# Patient Record
Sex: FEMALE | Race: WHITE | NOT HISPANIC OR LATINO | URBAN - METROPOLITAN AREA
[De-identification: names, ages, dates, MRNs, and addresses within clinical notes are randomized per-mention and may not be internally consistent; named-entity substitution may affect disease eponyms.]

---

## 2023-04-26 DIAGNOSIS — G47.00 INSOMNIA, UNSPECIFIED TYPE: Primary | ICD-10-CM

## 2023-04-26 RX ORDER — NORTRIPTYLINE HYDROCHLORIDE 10 MG/1
10 CAPSULE ORAL NIGHTLY
COMMUNITY
End: 2023-04-26 | Stop reason: SDUPTHER

## 2023-04-26 RX ORDER — NORTRIPTYLINE HYDROCHLORIDE 10 MG/1
10 CAPSULE ORAL NIGHTLY
Qty: 90 CAPSULE | Refills: 1 | Status: SHIPPED | OUTPATIENT
Start: 2023-04-26 | End: 2023-10-12 | Stop reason: SDUPTHER

## 2023-09-12 LAB
ALANINE AMINOTRANSFERASE (SGPT) (U/L) IN SER/PLAS: 33 U/L (ref 7–45)
ALBUMIN (G/DL) IN SER/PLAS: 4.3 G/DL (ref 3.4–5)
ALKALINE PHOSPHATASE (U/L) IN SER/PLAS: 53 U/L (ref 33–110)
ANION GAP IN SER/PLAS: 12 MMOL/L (ref 10–20)
ASPARTATE AMINOTRANSFERASE (SGOT) (U/L) IN SER/PLAS: 19 U/L (ref 9–39)
BILIRUBIN TOTAL (MG/DL) IN SER/PLAS: 0.8 MG/DL (ref 0–1.2)
CALCIDIOL (25 OH VITAMIN D3) (NG/ML) IN SER/PLAS: 28 NG/ML
CALCIUM (MG/DL) IN SER/PLAS: 9.4 MG/DL (ref 8.6–10.6)
CARBON DIOXIDE, TOTAL (MMOL/L) IN SER/PLAS: 29 MMOL/L (ref 21–32)
CHLORIDE (MMOL/L) IN SER/PLAS: 103 MMOL/L (ref 98–107)
CREATININE (MG/DL) IN SER/PLAS: 0.64 MG/DL (ref 0.5–1.05)
DEHYDROEPIANDROSTERONE SULFATE (DHEA-S) (UG/DL) IN SER/: 218 UG/DL (ref 65–395)
ESTIMATED AVERAGE GLUCOSE FOR HBA1C: 91 MG/DL
FOLLITROPIN (IU/L) IN SER/PLAS: 3.9 IU/L
GFR FEMALE: >90 ML/MIN/1.73M2
GLUCOSE (MG/DL) IN SER/PLAS: 82 MG/DL (ref 74–99)
HEMOGLOBIN A1C/HEMOGLOBIN TOTAL IN BLOOD: 4.8 %
INSULIN: 25 UIU/ML (ref 3–25)
LIPASE (U/L) IN SER/PLAS: 45 U/L (ref 9–82)
LUTEINIZING HORMONE (IU/ML) IN SER/PLAS: 7.9 IU/L
POTASSIUM (MMOL/L) IN SER/PLAS: 4.6 MMOL/L (ref 3.5–5.3)
PROTEIN TOTAL: 6.8 G/DL (ref 6.4–8.2)
SODIUM (MMOL/L) IN SER/PLAS: 139 MMOL/L (ref 136–145)
THYROTROPIN (MIU/L) IN SER/PLAS BY DETECTION LIMIT <= 0.05 MIU/L: 0.9 MIU/L (ref 0.44–3.98)
UREA NITROGEN (MG/DL) IN SER/PLAS: 11 MG/DL (ref 6–23)

## 2023-09-18 LAB
TESTOSTERONE FREE (CHAN): 10.5 PG/ML (ref 0.1–6.4)
TESTOSTERONE,TOTAL,LC-MS/MS: 68 NG/DL (ref 2–45)

## 2023-10-04 ENCOUNTER — TELEPHONE (OUTPATIENT)
Dept: ENDOCRINOLOGY | Facility: CLINIC | Age: 33
End: 2023-10-04
Payer: COMMERCIAL

## 2023-10-04 ENCOUNTER — TELEPHONE (OUTPATIENT)
Dept: ENDOCRINOLOGY | Facility: CLINIC | Age: 33
End: 2023-10-04

## 2023-10-04 ENCOUNTER — DOCUMENTATION (OUTPATIENT)
Dept: ENDOCRINOLOGY | Facility: CLINIC | Age: 33
End: 2023-10-04
Payer: COMMERCIAL

## 2023-10-04 DIAGNOSIS — L68.0 HIRSUTISM: ICD-10-CM

## 2023-10-04 DIAGNOSIS — L68.0 HIRSUTISM: Primary | ICD-10-CM

## 2023-10-04 RX ORDER — SPIRONOLACTONE 50 MG/1
50 TABLET, FILM COATED ORAL 2 TIMES DAILY
Qty: 60 TABLET | Refills: 2 | Status: SHIPPED | OUTPATIENT
Start: 2023-10-04 | End: 2023-10-11

## 2023-10-04 NOTE — TELEPHONE ENCOUNTER
Talked to the patient and refilled 50mg Spironolactone bid. Patient is a special needs and has her Mom on her side verifying the medication.  Patient is on TriSprintec for PCOS and contraception, has not been sexually active.    Christiana Mansfield MD, PGY6 3:05 PM 10/04/23

## 2023-10-11 DIAGNOSIS — K21.9 GASTROESOPHAGEAL REFLUX DISEASE, UNSPECIFIED WHETHER ESOPHAGITIS PRESENT: ICD-10-CM

## 2023-10-11 RX ORDER — SPIRONOLACTONE 50 MG/1
50 TABLET, FILM COATED ORAL 2 TIMES DAILY
Qty: 180 TABLET | Refills: 2 | Status: SHIPPED | OUTPATIENT
Start: 2023-10-11 | End: 2024-02-01 | Stop reason: SDUPTHER

## 2023-10-11 RX ORDER — OMEPRAZOLE 40 MG/1
40 CAPSULE, DELAYED RELEASE ORAL DAILY
COMMUNITY
Start: 2023-05-25 | End: 2023-10-11 | Stop reason: SDUPTHER

## 2023-10-11 RX ORDER — OMEPRAZOLE 40 MG/1
40 CAPSULE, DELAYED RELEASE ORAL DAILY
Qty: 90 CAPSULE | Refills: 0 | Status: SHIPPED | OUTPATIENT
Start: 2023-10-11 | End: 2024-01-02 | Stop reason: SDUPTHER

## 2023-10-12 DIAGNOSIS — G47.00 INSOMNIA, UNSPECIFIED TYPE: ICD-10-CM

## 2023-10-13 RX ORDER — NORTRIPTYLINE HYDROCHLORIDE 10 MG/1
10 CAPSULE ORAL NIGHTLY
Qty: 90 CAPSULE | Refills: 1 | Status: SHIPPED | OUTPATIENT
Start: 2023-10-13 | End: 2024-03-28 | Stop reason: SDUPTHER

## 2023-12-05 DIAGNOSIS — N92.6 IRREGULAR MENSTRUATION: Primary | ICD-10-CM

## 2023-12-05 RX ORDER — NORGESTIMATE AND ETHINYL ESTRADIOL 7DAYSX3 28
1 KIT ORAL DAILY
Qty: 28 TABLET | Refills: 5 | Status: SHIPPED | OUTPATIENT
Start: 2023-12-05 | End: 2024-06-03 | Stop reason: SDUPTHER

## 2023-12-05 RX ORDER — NORGESTIMATE AND ETHINYL ESTRADIOL 7DAYSX3 28
1 KIT ORAL DAILY
COMMUNITY
End: 2023-12-05 | Stop reason: SDUPTHER

## 2023-12-14 DIAGNOSIS — J30.9 ALLERGIC RHINITIS, UNSPECIFIED SEASONALITY, UNSPECIFIED TRIGGER: Primary | ICD-10-CM

## 2023-12-14 RX ORDER — CETIRIZINE HYDROCHLORIDE 10 MG/1
10 TABLET ORAL NIGHTLY
COMMUNITY
End: 2023-12-14 | Stop reason: SDUPTHER

## 2023-12-15 RX ORDER — CETIRIZINE HYDROCHLORIDE 10 MG/1
10 TABLET ORAL NIGHTLY
Qty: 90 TABLET | Refills: 1 | Status: SHIPPED | OUTPATIENT
Start: 2023-12-15 | End: 2024-02-05 | Stop reason: WASHOUT

## 2023-12-28 ENCOUNTER — ANCILLARY PROCEDURE (OUTPATIENT)
Dept: RADIOLOGY | Facility: CLINIC | Age: 33
End: 2023-12-28
Payer: COMMERCIAL

## 2023-12-28 ENCOUNTER — OFFICE VISIT (OUTPATIENT)
Dept: PRIMARY CARE | Facility: CLINIC | Age: 33
End: 2023-12-28
Payer: COMMERCIAL

## 2023-12-28 VITALS
WEIGHT: 232 LBS | SYSTOLIC BLOOD PRESSURE: 148 MMHG | HEIGHT: 71 IN | OXYGEN SATURATION: 97 % | BODY MASS INDEX: 32.48 KG/M2 | DIASTOLIC BLOOD PRESSURE: 88 MMHG | HEART RATE: 107 BPM

## 2023-12-28 DIAGNOSIS — Z00.00 HEALTH MAINTENANCE EXAMINATION: Primary | ICD-10-CM

## 2023-12-28 DIAGNOSIS — M25.572 ACUTE LEFT ANKLE PAIN: ICD-10-CM

## 2023-12-28 PROBLEM — Q87.3: Status: ACTIVE | Noted: 2023-12-28

## 2023-12-28 PROBLEM — E22.1 HYPERPROLACTINEMIA (MULTI): Status: ACTIVE | Noted: 2023-12-28

## 2023-12-28 PROBLEM — L68.0 FEMALE HIRSUTISM: Status: ACTIVE | Noted: 2022-01-12

## 2023-12-28 PROBLEM — K21.9 GERD (GASTROESOPHAGEAL REFLUX DISEASE): Status: ACTIVE | Noted: 2023-12-28

## 2023-12-28 PROBLEM — F41.9 ANXIETY: Status: ACTIVE | Noted: 2023-12-28

## 2023-12-28 PROBLEM — N91.1 AMENORRHEA, SECONDARY: Status: ACTIVE | Noted: 2023-12-28

## 2023-12-28 PROBLEM — Q89.7 DYSMORPHIC FEATURES: Status: ACTIVE | Noted: 2023-12-28

## 2023-12-28 PROBLEM — N92.6 IRREGULAR MENSTRUAL CYCLE: Status: ACTIVE | Noted: 2023-12-28

## 2023-12-28 PROBLEM — F89 DEVELOPMENTAL DISABILITY: Status: ACTIVE | Noted: 2023-12-28

## 2023-12-28 PROBLEM — E24.9 HYPERCORTISOLISM (MULTI): Status: ACTIVE | Noted: 2023-12-28

## 2023-12-28 PROBLEM — K59.00 CONSTIPATION: Status: ACTIVE | Noted: 2023-12-28

## 2023-12-28 PROBLEM — E88.819 INSULIN RESISTANCE: Status: ACTIVE | Noted: 2023-12-28

## 2023-12-28 PROBLEM — H50.34 INTERMITTENT EXOTROPIA, ALTERNATING: Status: ACTIVE | Noted: 2021-09-09

## 2023-12-28 PROBLEM — Q04.5: Status: ACTIVE | Noted: 2023-12-28

## 2023-12-28 PROBLEM — E28.8 HYPERANDROGENISM: Status: ACTIVE | Noted: 2023-12-28

## 2023-12-28 PROBLEM — E34.9 FEMALE HYPERTESTOSTERONEMIA: Status: ACTIVE | Noted: 2023-12-28

## 2023-12-28 PROBLEM — H55.01 CONGENITAL NYSTAGMUS: Status: ACTIVE | Noted: 2021-09-09

## 2023-12-28 PROBLEM — N97.9 FEMALE INFERTILITY: Status: ACTIVE | Noted: 2023-12-28

## 2023-12-28 PROBLEM — L65.9 FRONTAL BALDING: Status: ACTIVE | Noted: 2023-12-28

## 2023-12-28 PROBLEM — G43.909 MIGRAINE HEADACHE: Status: ACTIVE | Noted: 2023-12-28

## 2023-12-28 PROCEDURE — 73610 X-RAY EXAM OF ANKLE: CPT | Mod: LEFT SIDE | Performed by: RADIOLOGY

## 2023-12-28 PROCEDURE — 73610 X-RAY EXAM OF ANKLE: CPT | Mod: LT

## 2023-12-28 PROCEDURE — 99395 PREV VISIT EST AGE 18-39: CPT | Performed by: FAMILY MEDICINE

## 2023-12-28 ASSESSMENT — PATIENT HEALTH QUESTIONNAIRE - PHQ9
SUM OF ALL RESPONSES TO PHQ9 QUESTIONS 1 AND 2: 1
1. LITTLE INTEREST OR PLEASURE IN DOING THINGS: NOT AT ALL
2. FEELING DOWN, DEPRESSED OR HOPELESS: SEVERAL DAYS

## 2023-12-28 NOTE — ASSESSMENT & PLAN NOTE
Tenderness but no abnormality noted  Due to pt's disability will go ahead and obtain xray  Suspect tendinitis versus anxiety  Do recommend compression, elevation and icing to see if it improves

## 2023-12-28 NOTE — PROGRESS NOTES
Problem: Altered Mood, Manic Behavior:  Goal: Able to verbalize decrease in frequency and intensity of racing thoughts  Able to verbalize decrease in frequency and intensity of racing thoughts   Outcome: Ongoing  Psychoeducation Group Note    Date: 7/30/18  Start Time: 1330  End Time: 1425    Number Participants in Group:  10    Goal:  Patient will demonstrate increased interpersonal interaction.    Topic:  Relapse Prevention Plan Group    Discipline Responsible:   OT  AT  [facility]. X RT  Other       Participation Level:     None  Minimal   X Active Listener X Interactive    Monopolizing         Participation Quality:  X Appropriate  Inappropriate   X       Attentive        Intrusive   X       Sharing        Resistant   X       Supportive        Lethargic       Affective:   X Congruent  Incongruent  Blunted  Flat    Constricted  Anxious  Elated  Angry    Labile  Depressed  Other  Bright       Cognitive:  X Alert X Oriented PPTP     Concentration X G  F  P   Attention Span X G  F  P   Short-Term Memory X G  F  P   Long-Term Memory X G  F  P   ProblemSolving/  Decision Making X G  F  P   Ability to Process  Information X G  F  P      Contributing Factors             Delusional             Hallucinating             Flight of Ideas             Other:       Modes of Intervention:  X Education X Support X Exploration   X Clarifying X Problem Solving  Confrontation    Socialization X Limit Setting  Reality Testing    Activity  Movement  Media    Other:            Response to Learning:  X Able to verbalize current knowledge/experience   X Able to verbalize/acknowledge new learning   X Able to retain information   X Capable of insight    Able to change behavior   X Progressing to goal    Other:        Comments: Reason for Visit: Annual Physical Exam    HPI:  Presents for wellness exam  Doing ok overall  Continues to have knee pain bilaterally, chronic in nature, does not seem to change  Noticed some left ankle pain recently without any known injury  Was seeing endocrine but she left , does have information for a different physician but has not followed up    Active Problem List  Patient Active Problem List   Diagnosis    Amenorrhea, secondary    Anxiety    Congenital nystagmus    Constipation    Developmental disability    Dysmorphic features    Female hirsutism    Female hypertestosteronemia    Female infertility    Frontal balding    GERD (gastroesophageal reflux disease)    Hyperandrogenism    Hypercortisolism (CMS/HCC)    Hyperprolactinemia (CMS/HCC)    Insulin resistance    Intermittent exotropia, alternating    Irregular menstrual cycle    Velia overgrowth syndrome    Megalencephaly (CMS/HCC)    Migraine headache    Health maintenance examination    Acute left ankle pain       Comprehensive Medical/Surgical/Social/Family History  Past Medical History:   Diagnosis Date    Body mass index (BMI) 34.0-34.9, adult 12/27/2020    Body mass index (BMI) of 34.0 to 34.9 in adult    Irregular menstruation, unspecified 01/13/2022    Irregular menstrual cycle    Megalencephaly (CMS/HCC) 12/06/2021    Megalencephaly    Migraine, unspecified, not intractable, without status migrainosus 01/04/2022    Migraine headache    Multiple congenital malformations, not elsewhere classified 01/13/2022    Dysmorphic features    Nonscarring hair loss, unspecified 01/13/2022    Frontal balding    Other conditions influencing health status     Nulliparity     Past Surgical History:   Procedure Laterality Date    OTHER SURGICAL HISTORY  03/18/2022    Tonsillectomy    OTHER SURGICAL HISTORY  03/18/2022    Eye surgery     Social History     Tobacco Use    Smoking status: Never     No family history on file.      Allergies and Medications  Patient  "has no known allergies.  Current Outpatient Medications on File Prior to Visit   Medication Sig Dispense Refill    norgestimate-ethinyl estradioL (Tri-Sprintec, 28,) 0.18/0.215/0.25 mg-35 mcg (28) tablet Take 1 tablet by mouth once daily. as directed 28 tablet 5    nortriptyline (Pamelor) 10 mg capsule Take 1 capsule (10 mg) by mouth once daily at bedtime. 90 capsule 1    omeprazole (PriLOSEC) 40 mg DR capsule Take 1 capsule (40 mg) by mouth once daily. 90 capsule 0    spironolactone (Aldactone) 50 mg tablet TAKE 1 TABLET(50 MG) BY MOUTH TWICE DAILY 180 tablet 2    cetirizine (ZyrTEC) 10 mg tablet Take 1 tablet (10 mg) by mouth once daily at bedtime. (Patient not taking: Reported on 12/28/2023) 90 tablet 1     No current facility-administered medications on file prior to visit.         ROS otherwise negative aside from what was mentioned above in HPI.    Vitals  /88 (BP Location: Right arm, Patient Position: Sitting, BP Cuff Size: Adult)   Pulse 107   Ht 1.803 m (5' 11\")   Wt 105 kg (232 lb)   LMP 12/24/2023 (Approximate)   SpO2 97%   BMI 32.36 kg/m²   Body mass index is 32.36 kg/m².  Physical Exam  Gen: Alert, NAD  HEENT:  PERRL, EOMI, conjunctiva and sclera normal in appearance.  Neck:  Supple with FROM; No masses/nodes palpable  Respiratory:  Lungs CTAB  Cardiovascular:  Heart RRR. No M/R/G. Peripheral pulses equal bilaterally  Abdomen:  Soft, nontender  Extremities:  FROM all extremities; Muscle strength grossly normal, left ankle without any swelling, bruising or visible abnormality, mild tenderness over left malleolus,   Neuro:  CN II-XII intact; Gross motor and sensory intact  Skin:  No suspicious lesions present, hirsutism noted    Assessment and Plan:  Problem List Items Addressed This Visit       Health maintenance examination - Primary    Current Assessment & Plan     Recommended screening guidelines addressed and orders placed as indicated by age and chronic conditions  Screening labs " reviewed from recent endocrine appointment, do encourage reestablishing with endocrine  Continue to work on healthy lifestyle including well balanced diet, regular activity, limit alcohol, no tobacco products and safe sexual practices  Follow up annually           Acute left ankle pain    Current Assessment & Plan     Tenderness but no abnormality noted  Due to pt's disability will go ahead and obtain xray  Suspect tendinitis versus anxiety  Do recommend compression, elevation and icing to see if it improves         Relevant Orders    XR ankle left 3+ views         Heather Husain MD

## 2023-12-28 NOTE — ASSESSMENT & PLAN NOTE
Recommended screening guidelines addressed and orders placed as indicated by age and chronic conditions  Screening labs reviewed from recent endocrine appointment, do encourage reestablishing with endocrine  Continue to work on healthy lifestyle including well balanced diet, regular activity, limit alcohol, no tobacco products and safe sexual practices  Follow up annually

## 2024-01-02 DIAGNOSIS — K21.9 GASTROESOPHAGEAL REFLUX DISEASE, UNSPECIFIED WHETHER ESOPHAGITIS PRESENT: ICD-10-CM

## 2024-01-02 RX ORDER — OMEPRAZOLE 40 MG/1
40 CAPSULE, DELAYED RELEASE ORAL DAILY
Qty: 30 CAPSULE | Refills: 0 | Status: SHIPPED | OUTPATIENT
Start: 2024-01-02

## 2024-01-23 DIAGNOSIS — M25.572 ACUTE LEFT ANKLE PAIN: Primary | ICD-10-CM

## 2024-02-01 ENCOUNTER — OFFICE VISIT (OUTPATIENT)
Dept: ENDOCRINOLOGY | Facility: CLINIC | Age: 34
End: 2024-02-01
Payer: COMMERCIAL

## 2024-02-01 ENCOUNTER — LAB (OUTPATIENT)
Dept: LAB | Facility: LAB | Age: 34
End: 2024-02-01
Payer: COMMERCIAL

## 2024-02-01 VITALS
WEIGHT: 234.8 LBS | HEART RATE: 111 BPM | TEMPERATURE: 97.8 F | SYSTOLIC BLOOD PRESSURE: 162 MMHG | HEIGHT: 72 IN | BODY MASS INDEX: 31.8 KG/M2 | DIASTOLIC BLOOD PRESSURE: 92 MMHG

## 2024-02-01 DIAGNOSIS — E28.2 PCOS (POLYCYSTIC OVARIAN SYNDROME): ICD-10-CM

## 2024-02-01 DIAGNOSIS — R73.9 HYPERGLYCEMIA: ICD-10-CM

## 2024-02-01 DIAGNOSIS — E88.819 INSULIN RESISTANCE: Primary | ICD-10-CM

## 2024-02-01 DIAGNOSIS — E88.819 INSULIN RESISTANCE: ICD-10-CM

## 2024-02-01 DIAGNOSIS — L68.0 HIRSUTISM: ICD-10-CM

## 2024-02-01 LAB
ALBUMIN SERPL BCP-MCNC: 4.6 G/DL (ref 3.4–5)
ANION GAP SERPL CALC-SCNC: 15 MMOL/L (ref 10–20)
BUN SERPL-MCNC: 9 MG/DL (ref 6–23)
CALCIUM SERPL-MCNC: 10 MG/DL (ref 8.6–10.6)
CHLORIDE SERPL-SCNC: 102 MMOL/L (ref 98–107)
CO2 SERPL-SCNC: 26 MMOL/L (ref 21–32)
CREAT SERPL-MCNC: 0.69 MG/DL (ref 0.5–1.05)
DHEA-S SERPL-MCNC: 265 UG/DL (ref 12–379)
EGFRCR SERPLBLD CKD-EPI 2021: >90 ML/MIN/1.73M*2
EST. AVERAGE GLUCOSE BLD GHB EST-MCNC: 85 MG/DL
GLUCOSE SERPL-MCNC: 80 MG/DL (ref 74–99)
HBA1C MFR BLD: 4.6 %
PHOSPHATE SERPL-MCNC: 4.4 MG/DL (ref 2.5–4.9)
POTASSIUM SERPL-SCNC: 4.5 MMOL/L (ref 3.5–5.3)
SODIUM SERPL-SCNC: 138 MMOL/L (ref 136–145)

## 2024-02-01 PROCEDURE — 82627 DEHYDROEPIANDROSTERONE: CPT

## 2024-02-01 PROCEDURE — 83036 HEMOGLOBIN GLYCOSYLATED A1C: CPT

## 2024-02-01 PROCEDURE — 80069 RENAL FUNCTION PANEL: CPT

## 2024-02-01 PROCEDURE — 84402 ASSAY OF FREE TESTOSTERONE: CPT

## 2024-02-01 PROCEDURE — 99214 OFFICE O/P EST MOD 30 MIN: CPT | Performed by: STUDENT IN AN ORGANIZED HEALTH CARE EDUCATION/TRAINING PROGRAM

## 2024-02-01 PROCEDURE — 1036F TOBACCO NON-USER: CPT | Performed by: STUDENT IN AN ORGANIZED HEALTH CARE EDUCATION/TRAINING PROGRAM

## 2024-02-01 PROCEDURE — 36415 COLL VENOUS BLD VENIPUNCTURE: CPT

## 2024-02-01 RX ORDER — SPIRONOLACTONE 50 MG/1
50 TABLET, FILM COATED ORAL 2 TIMES DAILY
Qty: 180 TABLET | Refills: 2 | Status: SHIPPED | OUTPATIENT
Start: 2024-02-01

## 2024-02-01 NOTE — PATIENT INSTRUCTIONS
-when you get off trulicity, let me know about 3 months after so I can order your glucose tolerance test      Follow up in 6 months     Angela Robles MD  Divison of Endocrinology   [facility]   Phone: [phone]    option 4, then option 1  Fax: [phone]

## 2024-02-01 NOTE — PROGRESS NOTES
33 F Velia sotos like syndrome    Following up regarding insulin resistance, previously seen Dr. 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    Social History     Socioeconomic History    Marital status: Single     Spouse name: Not on file    Number of children: Not on file    Years of education: Not on file    Highest education level: Not on file   Occupational History    Not on file   Tobacco Use    Smoking status: Never    Smokeless tobacco: Never   Substance and Sexual Activity    Alcohol use: Not on file    Drug use: Not on file    Sexual activity: Not on file   Other Topics Concern    Not on file   Social History Narrative    Not on file     Social Determinants of Health     Financial Resource Strain: Not on file   Food Insecurity: Not on file   Transportation Needs: Not on file   Physical Activity: Not on file   Stress: Not on file   Social Connections: Not on file   Intimate Partner Violence: Not on file   Housing Stability: Not on file     ROS reviewed and is negative except for  pertinent findings noted on HPI    Physical Exam  Constitutional:       Appearance: Normal appearance.      Comments: Enlarged forehead   Cardiovascular:      Rate and Rhythm: Normal rate and regular rhythm.   Abdominal:      Palpations: Abdomen is soft.      Comments: Abdominal obesity   Musculoskeletal:         General: No swelling or tenderness.   Skin:     General: Skin is warm.      Comments: Male pattern baldness, terminal hair on the arms and legs    Neurological:      General: No focal deficit present.      Mental Status: She is alert and oriented to person, place, and time.   Psychiatric:         Mood and Affect: Mood normal.         Behavior: Behavior normal.         labs and imaging reviewed, pertinent findings listed on HPI and Impression    Problem List Items Addressed This Visit       Insulin resistance - Primary    Relevant Medications    dulaglutide (Trulicity) 0.75 mg/0.5 mL pen injector    Other Relevant Orders    Hemoglobin A1C     Other Visit Diagnoses       Hirsutism        Relevant Medications    spironolactone (Aldactone) 50 mg tablet    PCOS (polycystic ovarian syndrome)        Relevant Medications    dulaglutide (Trulicity) 0.75 mg/0.5 mL pen injector    Other Relevant Orders    Hemoglobin A1C    DHEA-Sulfate    Testosterone,Free and Total    Renal Function Panel    Hyperglycemia        Relevant Orders    Hemoglobin A1C          Insulin Resistance   -continue spironolactone 50mg BID  -metabolic/diabetic screening labs   -continue trulicity 0.75mg once week    Might need OGTT for DM screening

## 2024-02-05 ENCOUNTER — HOSPITAL ENCOUNTER (OUTPATIENT)
Dept: RADIOLOGY | Facility: HOSPITAL | Age: 34
Discharge: HOME | End: 2024-02-05
Payer: COMMERCIAL

## 2024-02-05 ENCOUNTER — OFFICE VISIT (OUTPATIENT)
Dept: ORTHOPEDIC SURGERY | Facility: HOSPITAL | Age: 34
End: 2024-02-05
Payer: COMMERCIAL

## 2024-02-05 DIAGNOSIS — M25.572 LEFT ANKLE PAIN, UNSPECIFIED CHRONICITY: ICD-10-CM

## 2024-02-05 DIAGNOSIS — M76.72 PERONEAL TENDONITIS, LEFT: Primary | ICD-10-CM

## 2024-02-05 DIAGNOSIS — M76.829 POSTERIOR TIBIAL TENDON DYSFUNCTION: ICD-10-CM

## 2024-02-05 PROCEDURE — L4361 PNEUMA/VAC WALK BOOT PRE OTS: HCPCS | Performed by: ORTHOPAEDIC SURGERY

## 2024-02-05 PROCEDURE — 1036F TOBACCO NON-USER: CPT | Performed by: ORTHOPAEDIC SURGERY

## 2024-02-05 PROCEDURE — 99204 OFFICE O/P NEW MOD 45 MIN: CPT | Performed by: ORTHOPAEDIC SURGERY

## 2024-02-05 PROCEDURE — 73610 X-RAY EXAM OF ANKLE: CPT | Mod: LEFT SIDE | Performed by: RADIOLOGY

## 2024-02-05 PROCEDURE — 99214 OFFICE O/P EST MOD 30 MIN: CPT | Performed by: ORTHOPAEDIC SURGERY

## 2024-02-05 PROCEDURE — 73610 X-RAY EXAM OF ANKLE: CPT | Mod: LT

## 2024-02-05 NOTE — PROGRESS NOTES
NPV-   History of Present Illness  33 y.o.female  1. Peroneal tendonitis, left  Walking Boot Tall    Referral to Physical Therapy      2. Left ankle pain, unspecified chronicity  XR ankle left 3+ views      3. Posterior tibial tendon dysfunction          Mechanism of injury:   Date of Injury/Pain:   Location of pain:   Quality of pain:   Frequency of Pain:   Associated symptoms? Swelling.  Previous treatment?

## 2024-02-05 NOTE — PROGRESS NOTES
HISTORY OF PRESENT ILLNESS  This is a pleasant 33 y.o. year old female  who presents on 02/05/2024 at the request of Stephanie Krause, EZEKIEL [unfilled] for evaluation of the left ankle and foot and    pain that has been present over/since 4 weeks.    How the condition occurred or started: increased walking/standing at work  Location of pain (patient points to): lateral ankle  Quality of pain: Moderate  Modifying Factors: walking standing  Associated Signs and symptoms: swelling  Previous Treatment: ankle wrap  PMH of Velia overgrowth syndrome    PHYSICAL EXAMINATION  Constitutional Exam: patient's height and weight reviewed, well-kempt  Psychiatric Exam: alert and oriented x 3, appropriate mood and behavior  Eye Exam: PIEDAD, EOMI  Pulmonary Exam: breathing non-labored, no apparent distress  Lymphatic exam: no appreciable lymphadenopathy in the lower extremities  Cardiovascular exam: DP pulses 2+ bilaterally, PT 2+ bilaterally, toes are pink with good capillary refill, no pitting edema  Skin exam: no open lesions, rashes, abrasions or ulcerations  Neurological exam: sensation to light touch intact in both lower extremities in peripheral and dermatomal distributions (except for any abnormalities noted in musculoskeletal exam)    Musculoskeletal exam: On examination of the left  ankle/foot:  Normal gait  Pes planus alignment  Minimal swelling; No ecchymosis or erythema. Skin intact; no ulcers or lesions.   Normal ROM in  ankle plantarflexion, dorsiflexion, inversion and eversion; normal ROM in 2-5th toe flexion/extension and 1st MTP flexion/extension  Normal strength in ankle plantarflexion, dorsiflexion, inversion and eversion; normal strength with great toe flexion/extension  Tenderness to palpation: peroneal tendons  No tenderness to palpation over the Achilles, medial and lateral malleolus, posterior tibial tendon, peroneal tendon, ATFL, deltoid ligament, talus or navicular.  no tenderness to palpation over  "heel, plantar arch, base of 1st metatarsal/2nd metatarsal, sesamoids, 5th metatarsal, medial cuneiform, navicular, 1st MTP joint or 2nd or 3rd intermetarsal space.  Neurovascularly intact. Good capillary refill. No sensory deficit to light touch. Normal proprioception. Dorsalis pedis and posterior tibial pulses 2+ bilaterally.    - Brown's test                              - Dorsiflexion-eversion test  - Anterior Drawer test                      - resisted dorsiflexion-eversion test  - Talar tilt test                                    - shuck testing  - Squeeze test  + \"too many toes\" sign      DATA/RESULTS REVIEW: I personally reviewed the patient's x-ray images and reports of the the left ankle and foot and they show no fractures or dislocations . Normal joint spacing     ASSESSMENT: left ankle peroneal tendonitis, b/l pttd grade 3  PLAN: I discussed with the patient the differential diagnosis, complex  nature of the condition(s) and available treatment option(s).  Patient was placed in a tall CAM walker boot to be WBAT with crutches.  They were given boot instructions. Patient is ambulatory and was given a prescription for orthotics, which are medically necessary due to the patient's medical condition and symptomatic pes planus. The patient was given a prescription for physical therapy.  Physical therapy is medically necessary to improve strength, balance, range of motion and functional outcomes after injury and/or surgery. Patient was given a handout and instructed on an at home stretching program.  They should do these exercises 3 times per day for 6 weeks and then daily. Patient can use OTC Voltaren gel, biofreeze or  aspercream for pain and continue to ice and elevate supported at the calf to reduce swelling .  The patient's questions were answered in detail.      Patient was prescribed a CAM walker boot for peroneal tendonitis.The patient is ambulatory with or without aid; but, has weakness, instability " "and/or deformity of their left ankle/foot which requires stabilization from this orthosis to improve their function.      Verbal and written instructions for the use, wear schedule, cleaning and application of this item were given.  Patient was instructed that should the brace result in increased pain, decreased sensation, increased swelling, or an overall worsening of their medical condition, to please contact our office immediately.     Orthotic management and training was provided for skin care, modifications due to healing tissues, edema changes, interruption in skin integrity, and safety precautions with the orthosis.      Note dictated with [employer] CK1 software, completed without full type editing to avoid delay.      Dear Referring Physician,  It was my pleasure to see your patient, in the office today.  Please refer to the detailed notes above for my findings and recommendations.  Thank you once again for your consultation request.  If you have any further questions or concerns, please feel free to contact me via email or at the phone number and address below.  Sincerely,    Vivian Huerta MD   of Orthopedic Surgery, [employer]  Dual Fellowship-trained in Orthopedic Sports Medicine (Knee and Shoulder), and Foot and Ankle Surgery  [facility] [place]   ABOS Subspecialty Certificate in Orthopedic Sports Medicine  Head Team Physician Case \"[place]\" and [facility] \"Storm\"  Team [place] OP Physician [id number] Paralympic Games  Team [place] US Figure Skating Medical Practitioner, including International Event Coverage  Director, Foot and Ankle Division, Department of Orthopedics    [address], [address] [address]  [email or web address]  Office [phone]    "

## 2024-02-05 NOTE — PROGRESS NOTES
HISTORY OF PRESENT ILLNESS  This is a pleasant 33 y.o. year old female  who presents on 02/05/2024 at the request of Stephanie Krause, [facility]KIEL [facility] for evaluation of the left ankle and foot and burning  pain that has been present over/since 6+ weeks.    How the condition occurred or started: Increased work on her feet   Location of pain (patient points to): left lateral malleolus   Quality of pain: Moderate  Modifying Factors: Compression sleeve with no relief   Associated Signs and symptoms: Pain to flexion ***   Previous Treatment: ***   PMH of Velia overgrowth syndrome    PHYSICAL EXAMINATION  Constitutional Exam: patient's height and weight reviewed, well-kempt  Psychiatric Exam: alert and oriented x 3, appropriate mood and behavior  Eye Exam: PIEDAD, EOMI  Pulmonary Exam: breathing non-labored, no apparent distress  Lymphatic exam: no appreciable lymphadenopathy in the lower extremities  Cardiovascular exam: DP pulses 2+ bilaterally, PT 2+ bilaterally, toes are pink with good capillary refill, no pitting edema  Skin exam: no open lesions, rashes, abrasions or ulcerations  Neurological exam: sensation to light touch intact in both lower extremities in peripheral and dermatomal distributions (except for any abnormalities noted in musculoskeletal exam)    Musculoskeletal exam: *** Patient's     DATA/RESULTS REVIEW: I personally reviewed the patient's x-ray images and reports of the the left ankle and foot.     ASSESSMENT: ***  PLAN: I discussed with the patient the differential diagnosis, complex *** nature of the condition(s) and available treatment option(s).  ***.  The patient's questions were answered in detail.      Note dictated with [employer] software, completed without full type editing to avoid delay.

## 2024-02-05 NOTE — PATIENT INSTRUCTIONS
YOUR BOOT INSTRUCTIONS:  You can put weight on your foot and ankle while in the boot.    You can use crutches or walker for support as needed.   You should wear boot when walking or standing for 3 weeks.  You can take off your boot while bathing, sitting, stretching, icing or doing therapy exercises.  You can take your boot off at nighttime while sleeping.    BOOT WEANING:  DAY 1-- come out of boot for 1 hour (wear supportive athletic shoes and orthotic inserts), rest of the day in boot  DAY 2-- come out of boot for 2 hours (wear supportive athletic shoes and orthotic inserts), rest of the day in boot  DAY 3-- come out of boot for 3 hours (wear supportive athletic shoes and orthotic inserts), rest of the day in boot  DAY 4-- come out of boot for 4 hours (wear supportive athletic shoes and orthotic inserts), rest of the day in boot  DAY 5-- come out of boot and wear supportive shoes and orthotic inserts  * if you have pain while weaning out of boot then go back one day in the protocol (i.e. If really sore on the morning of Day 3 from coming out of boot for 2 hours the previous day, go back to Day 1 and start again through the protocol)    You can also use OTC Voltaren gel or  aspercreme ointment and apply it to the injured area.      Ice and elevate supported at the calf to reduce swelling    The patient was given a prescription for physical therapy.  Physical therapy is medically necessary to improve strength, balance, range of motion and functional outcomes after injury and/or surgery.    1. Follow stretching exercises that were on a separate handout   2. Hold each stretch for a least 1 minute  3. Do not bounce while stretching  4. Stretch for 10 minutes at a time, 3x a day for 6 weeks then daily  5. Remember, it takes several weeks to a few months of consistent stretching to increase flexibility and decrease symptoms.     Please call and schedule an appointment to get fitted or molded for your custom made  orthotics (see your prescription for phone number).  You need to bring your prescription with you to your appointment.  Insurance coverage for orthotics varies widely and you can contact your insurance company to find out whether orthotics are covered or not with your plan.  The orthotics company also can give you a cost estimate.     Once your custom made orthotics are made and ready to use, then take out your shoe's insert that it came with and put the orthotic in your shoe.  Orthotics generally work better in lace-up types of shoes (athletic shoes, clarks, keans, merrells, sketchers, etc.).  Sometimes, to accommodate your orthotic, you may have to wear a ½ to one size bigger shoe.  Your orthotics can be adjusted for comfort.  Your pedorthotist can help with adjustments after you wear your orthotics for an initial period of time.    Follow up as needed

## 2024-02-06 LAB
TESTOSTERONE FREE (CHAN): 10.1 PG/ML (ref 0.1–6.4)
TESTOSTERONE,TOTAL,LC-MS/MS: 94 NG/DL (ref 2–45)

## 2024-03-05 ENCOUNTER — EVALUATION (OUTPATIENT)
Dept: PHYSICAL THERAPY | Facility: CLINIC | Age: 34
End: 2024-03-05
Payer: COMMERCIAL

## 2024-03-05 DIAGNOSIS — M76.72 PERONEAL TENDONITIS, LEFT: ICD-10-CM

## 2024-03-05 PROCEDURE — 97161 PT EVAL LOW COMPLEX 20 MIN: CPT | Mod: GP

## 2024-03-05 PROCEDURE — 97110 THERAPEUTIC EXERCISES: CPT | Mod: GP

## 2024-03-05 ASSESSMENT — ENCOUNTER SYMPTOMS
OCCASIONAL FEELINGS OF UNSTEADINESS: 0
DEPRESSION: 0
LOSS OF SENSATION IN FEET: 0

## 2024-03-05 NOTE — PROGRESS NOTES
"Physical Therapy Evaluation    Subjective:  Patient Name: Milena Pardo  MRN: [id number]  Today's Date: 3/5/2024  Visit: 1/30  Referred by: Deanna     Diagnosis: L ankle pain  Mechanism of Injury:  Patient reports L ankle pain began a couple months ago insidiously.  Location: posterior ankle and reports over 1st ray currently although pain location varies  Pain Rating: 10/10 at work, \"burning\" pain  Increased pain: walking/standing, stairs  Decreased pain: rest with boot off    Current Medical Management:  - X-ray= negative  -given script for foot orthotics for pes planus.  Initiated getting them with [facility], to be picked up 3/13/24.  -Has been wearing walking boot x 1 month,  Uses the boot mainly for work.  Walks without boot/shoe in house.  Precautions: WBAT with boot  Functional Limitations:  Limited in walking due to use of boot  Prior Level of Function:  Has assist with cognitive tasks due to developmental disability  Work Status: Works at [employer], entails a lot of standing.  Works 5 hours a day.   Living Environment: 2 story home  Social Support: lives with parents    Objective:    Gait: Amb without assistive device with L walking boot  Palpation: TTP over L medial ankle and over first ray.  Small pocket of swelling over L navicular  Bilat pes planus    LE Strength (L):  Hip flexion= 5  Knee flexion= 4+  Knee extension= 4+  Ankle DF= 4+  Ankle PF= 5 NWB  Ankle inver= 4+  Ankle ever= 4+    LE ROM/Flexibility (R/L):  Knee flexion= WNL  Knee extension= WNL  Hip flexion= WNL  Hamstrings=tightness L  Hip IR= tight L  Hip ER= negative  Ankle DF= 15  Ankle PF= 58  Ankle inversion= 12  Ankle eversion= 10    Treatment Performed Today:  Reviewed HEP given by   Instructed to continue with AP (supine), active inver/eversion, passive DF stretch with band, seated hamstring stretch and seated heel and toe raises.  Discussed gradual progression out of boot into shoe with inserts once inserts received.  Also suggested foot " support while in home due to pes planus.    Assessment:  32 yo F with L ankle pain of insidious onset.  Presents with impaired gait (with walking boot), decreased ankle ROM and strength, and pain.   Would benefit from PT to address these deficits to allow full progression back to pain free gait.    Plan:  -Goals:  Active       PT Problem       Patient to report no more than 3/10 pain.       Start:  03/05/24    Expected End:  06/03/24            Able to improve standing tolerance to allow patient to perform work duties without pain.       Start:  03/05/24    Expected End:  06/03/24            Able to ambulate community distances without pain.       Start:  03/05/24    Expected End:  06/03/24            Increase L ankle DF to at least 10 degrees.       Start:  03/05/24    Expected End:  06/03/24            5/5 L ankle strength.       Start:  03/05/24    Expected End:  06/03/24              -Frequency & Duration: 1x/week x 6-8 weeks.  -Rehab Potential: Good  Plan of care was developed with input and agreement of the patient.

## 2024-03-20 ENCOUNTER — TREATMENT (OUTPATIENT)
Dept: PHYSICAL THERAPY | Facility: CLINIC | Age: 34
End: 2024-03-20
Payer: COMMERCIAL

## 2024-03-20 DIAGNOSIS — M76.72 PERONEAL TENDONITIS, LEFT: ICD-10-CM

## 2024-03-20 PROCEDURE — 97110 THERAPEUTIC EXERCISES: CPT | Mod: GP,CQ | Performed by: PHYSICAL THERAPY ASSISTANT

## 2024-03-20 NOTE — PROGRESS NOTES
"Physical Therapy Treatment    Patient Name: Milena Pardo  MRN: [id number]  Today's Date: 3/20/2024  Visit# 2/30  Diagnosis:   1. Peroneal tendonitis, left  Follow Up In Physical Therapy           PRECAUTIONS:      SUBJECTIVE:  Pt enters into therapy reporting pain level remains at high grade level. She states pain is 10/10 but I did explain to her was a 10/10 grade level was and we came to the conclusion is around a 5/10. She states that being on her feet all day at her job keeps her ankle irritated.    OBJECTIVE:  Difficulty following multiple step commands. Can become erratic with exercises. Cont cues provided to slow down and control pace of exercises.    Amb with with WB on lateral aspect of L foot with decreased DF at initial contact.     Dem lack of voluntary control with OC DF with band.   TREATMENT:  - Therex:   Bike 6', L5  Calf raises 3x10  Calf str 30\"x3  3 way foot touches, fwd, lat, retro x15ea  Alt foot taps on step x30  Ankle str on step x20  Step downs 4\" x20  Lateral walks 20\"x4  Stand hip 3 way x20  Seated ankle PRE's GTB 3x10  - Manual Therapy:       - Neuromuscular Re-education:        - Gait Train:       - Modalities:           ASSESSMENT:   Pain remained at lower grade level and manageable with session. Dem ability WB without major compensation when amb. Can benefit with cont therapy to address strength and flexibility deficits. I stressed to her to make sure that she takes 10-15 min seated breaks every few hrs at work to off load ankle.     PLAN:   Cont with load progression as rosalina.    Attempt to add ankle IV/EV AROM to resisted     Access Code: [email or web address]  URL: [email or web address]  Date: 03/20/2024  Prepared by: Nura Jacome    Exercises  - Ankle Plantar Flexion with Resistance  - 1 x daily - 7 x weekly - 3 sets - 10 reps  - Ankle Dorsiflexion with Resistance  - 1 x daily - 7 x weekly - 3 sets - 10 reps  - Ankle and Toe Plantarflexion with Resistance  - 1 x daily - 7 x " weekly - 3 sets - 10 reps  - Standing Hip Flexion with Counter Support  - 1 x daily - 7 x weekly - 2-3 sets - 10 reps  - Standing Hip Abduction with Counter Support  - 1 x daily - 7 x weekly - 2-3 sets - 10 reps  - Standing Hip Extension with Counter Support  - 1 x daily - 7 x weekly - 2-3 sets - 10 reps  - Standing Heel Raise  - 1 x daily - 7 x weekly - 3 sets - 10 reps

## 2024-03-27 ENCOUNTER — TREATMENT (OUTPATIENT)
Dept: PHYSICAL THERAPY | Facility: CLINIC | Age: 34
End: 2024-03-27
Payer: COMMERCIAL

## 2024-03-27 DIAGNOSIS — M76.72 PERONEAL TENDONITIS, LEFT: ICD-10-CM

## 2024-03-27 PROCEDURE — 97110 THERAPEUTIC EXERCISES: CPT | Mod: GP,CQ | Performed by: PHYSICAL THERAPY ASSISTANT

## 2024-03-27 NOTE — PROGRESS NOTES
"Physical Therapy Treatment    Patient Name: Milena Pardo  MRN: [id number]  Today's Date: 3/27/2024  Visit# 3/30  Diagnosis:   1. Peroneal tendonitis, left  Follow Up In Physical Therapy           PRECAUTIONS:      SUBJECTIVE:  Pt enters into therapy reporting no pain but was a little sore after work.   OBJECTIVE:  Has difficulty follow multiple command cues with exercises. Good calf str. Has some difficulty initiating ankle IV/EV activities motion but performed better with cues.       TREATMENT:  - Therex:   Bike 6', L5  Calf raises 3x12  Calf str 30\"x3  Stand alt marches on airex x20  Lateral/retro step x20  Ankle str on step x20  Step downs/ Lat 4\" 2x12  Lateral walks 20\"x4  Cone drills,step overs, AP/LAT  Cone taps x20  Stand hip 3 way x20  Seated ankle PRE's GTB 3x10  - Manual Therapy:       - Neuromuscular Re-education:        - Gait Train:       - Modalities:           ASSESSMENT:  Increased exercise intensity. Pain remained minimal and manageable with session. Encouraged to cont HEP and to take periodic seated breaks at work to take some load off her ankle. Dem very good effort and compliance.   PLAN:   Cont with load progression as rosalina.    Attempt to add ankle IV/EV AROM to resisted     Access Code: [email or web address]  URL: [email or web address]  Date: 03/20/2024  Prepared by: Nura Jacome    Exercises  - Ankle Plantar Flexion with Resistance  - 1 x daily - 7 x weekly - 3 sets - 10 reps  - Ankle Dorsiflexion with Resistance  - 1 x daily - 7 x weekly - 3 sets - 10 reps  - Ankle and Toe Plantarflexion with Resistance  - 1 x daily - 7 x weekly - 3 sets - 10 reps  - Standing Hip Flexion with Counter Support  - 1 x daily - 7 x weekly - 2-3 sets - 10 reps  - Standing Hip Abduction with Counter Support  - 1 x daily - 7 x weekly - 2-3 sets - 10 reps  - Standing Hip Extension with Counter Support  - 1 x daily - 7 x weekly - 2-3 sets - 10 reps  - Standing Heel Raise  - 1 x daily - 7 x weekly - 3 sets - 10 " reps

## 2024-03-28 DIAGNOSIS — G47.00 INSOMNIA, UNSPECIFIED TYPE: ICD-10-CM

## 2024-03-28 RX ORDER — NORTRIPTYLINE HYDROCHLORIDE 10 MG/1
10 CAPSULE ORAL NIGHTLY
Qty: 90 CAPSULE | Refills: 1 | Status: SHIPPED | OUTPATIENT
Start: 2024-03-28 | End: 2024-09-24

## 2024-04-02 ENCOUNTER — TREATMENT (OUTPATIENT)
Dept: PHYSICAL THERAPY | Facility: CLINIC | Age: 34
End: 2024-04-02
Payer: COMMERCIAL

## 2024-04-02 DIAGNOSIS — M76.72 PERONEAL TENDONITIS, LEFT: ICD-10-CM

## 2024-04-02 PROCEDURE — 97110 THERAPEUTIC EXERCISES: CPT | Mod: GP

## 2024-04-02 NOTE — PROGRESS NOTES
"Physical Therapy Treatment    Patient Name: Milena Pardo  MRN: [id number]  Today's Date: 4/2/2024  Visit# 4/30  Diagnosis:   1. Peroneal tendonitis, left  Follow Up In Physical Therapy          SUBJECTIVE:  Reports has been out of boot a couple weeks.  Had to wear boot to work yesterday due to pain  Does not feel much relief of pain since being out of boot.  (Patient's mother present to help with subj)  Has been using new orthotics  States exercises (ankle pump) she does at work are painful    OBJECTIVE:  Demonstrates ankle pumps. Can do full ROM with manual cues.  Reports painful with DF, but unable to localize area of pain.    TREATMENT:  - Therex:   Scifit x 5 min, L5  Calf stretch on slantboard x 1 min   Wall calf stretch L with manual cues 3x10 sec  Balance Training:  -SLS L  -1/2 and near full tandem  -foam- static and marching  -1/2 foam  6 inch step ups L x 15 reps  Lateral walks 20\"x4  Fwd/bkwd walking to cones  Weaving in/out of cones  Cone taps x20    ASSESSMENT:  Discussed using neoprene ankle sleeve for work if needed.  When asked if pain present at end of session, patient stated yes.  However, was able to complete all exercises without sign of pain except SLS.  Very challenged on 1/2 foam balance.    PLAN:   Continue with ankle strengthening/proprioception training.         Access Code: [email or web address]  URL: [email or web address]  Date: 03/20/2024  Prepared by: Nura Jacome    Exercises  - Ankle Plantar Flexion with Resistance  - 1 x daily - 7 x weekly - 3 sets - 10 reps  - Ankle Dorsiflexion with Resistance  - 1 x daily - 7 x weekly - 3 sets - 10 reps  - Ankle and Toe Plantarflexion with Resistance  - 1 x daily - 7 x weekly - 3 sets - 10 reps  - Standing Hip Flexion with Counter Support  - 1 x daily - 7 x weekly - 2-3 sets - 10 reps  - Standing Hip Abduction with Counter Support  - 1 x daily - 7 x weekly - 2-3 sets - 10 reps  - Standing Hip Extension with Counter Support  - 1 x daily - " 7 x weekly - 2-3 sets - 10 reps  - Standing Heel Raise  - 1 x daily - 7 x weekly - 3 sets - 10 reps

## 2024-04-09 ENCOUNTER — TREATMENT (OUTPATIENT)
Dept: PHYSICAL THERAPY | Facility: CLINIC | Age: 34
End: 2024-04-09
Payer: COMMERCIAL

## 2024-04-09 DIAGNOSIS — M76.72 PERONEAL TENDONITIS, LEFT: Primary | ICD-10-CM

## 2024-04-09 NOTE — PROGRESS NOTES
Physical Therapy                 Therapy Communication Note    Patient Name: Milena Pardo  MRN: [id number]  Today's Date: 4/9/2024     Discipline: Physical Therapy    Missed Visit Reason:  ill    Missed Time: Cancel

## 2024-04-25 ENCOUNTER — APPOINTMENT (OUTPATIENT)
Dept: PHYSICAL THERAPY | Facility: CLINIC | Age: 34
End: 2024-04-25
Payer: COMMERCIAL

## 2024-05-21 ENCOUNTER — TREATMENT (OUTPATIENT)
Dept: PHYSICAL THERAPY | Facility: CLINIC | Age: 34
End: 2024-05-21
Payer: COMMERCIAL

## 2024-05-21 DIAGNOSIS — M76.72 PERONEAL TENDONITIS, LEFT: Primary | ICD-10-CM

## 2024-05-21 PROCEDURE — 97112 NEUROMUSCULAR REEDUCATION: CPT | Mod: GP

## 2024-05-21 PROCEDURE — 97110 THERAPEUTIC EXERCISES: CPT | Mod: GP

## 2024-05-21 NOTE — PROGRESS NOTES
"Physical Therapy Treatment    Patient Name: Milena Pardo  MRN: [id number]  Today's Date: 5/21/2024  Visit# 5/30  Diagnosis: L ankle pain    SUBJECTIVE:  Has not been wearing boot at all anymore  Has been using new orthotics and new shoes from [employer].  However, reports pain on dorsum of foot bilat when wearing new shoes.  Discussed may need to take shoes back for better fitting.  States still has actual ankle pain as well.    OBJECTIVE:  Wearing flat tennis shoes during session    TREATMENT:  - Therex:   Scifit x 5 min, L5  Calf stretch on slantboard x 1 min   Heel and toe raises  Balance Training:  -SLS L  -near full tandem  -foam- static and marching  -1/2 foam  -tandem walking  6 inch step ups L x 15 reps  Lateral walks 20\"x4  Fwd/bkwd walking to cones  Weaving in/out of cones    ASSESSMENT:  Patient was able to complete all exercises without compensatory movements or signs of pain.  However, is difficulty to subjectively determine pain due to cognition.  Patient's mother is comfortable to discontinue PT.    PLAN:   Will keep chart open x 1 month should sx return.  Otherwise, will DC at that time.         Access Code: [email or web address]  URL: [email or web address]  Date: 03/20/2024  Prepared by: Nura Jacome    Exercises  - Ankle Plantar Flexion with Resistance  - 1 x daily - 7 x weekly - 3 sets - 10 reps  - Ankle Dorsiflexion with Resistance  - 1 x daily - 7 x weekly - 3 sets - 10 reps  - Ankle and Toe Plantarflexion with Resistance  - 1 x daily - 7 x weekly - 3 sets - 10 reps  - Standing Hip Flexion with Counter Support  - 1 x daily - 7 x weekly - 2-3 sets - 10 reps  - Standing Hip Abduction with Counter Support  - 1 x daily - 7 x weekly - 2-3 sets - 10 reps  - Standing Hip Extension with Counter Support  - 1 x daily - 7 x weekly - 2-3 sets - 10 reps  - Standing Heel Raise  - 1 x daily - 7 x weekly - 3 sets - 10 reps     "

## 2024-06-03 DIAGNOSIS — N92.6 IRREGULAR MENSTRUATION: ICD-10-CM

## 2024-06-03 RX ORDER — NORGESTIMATE AND ETHINYL ESTRADIOL 7DAYSX3 28
1 KIT ORAL DAILY
Qty: 28 TABLET | Refills: 5 | Status: SHIPPED | OUTPATIENT
Start: 2024-06-03

## 2024-07-29 DIAGNOSIS — E55.9 VITAMIN D DEFICIENCY: ICD-10-CM

## 2024-07-29 RX ORDER — CHOLECALCIFEROL (VITAMIN D3) 25 MCG
TABLET ORAL
Qty: 90 TABLET | Refills: 3 | Status: SHIPPED | OUTPATIENT
Start: 2024-07-29

## 2024-07-29 RX ORDER — CHOLECALCIFEROL (VITAMIN D3) 25 MCG
1000 TABLET ORAL DAILY
COMMUNITY
End: 2024-07-29 | Stop reason: SDUPTHER

## 2024-08-01 ENCOUNTER — APPOINTMENT (OUTPATIENT)
Dept: ENDOCRINOLOGY | Facility: CLINIC | Age: 34
End: 2024-08-01
Payer: COMMERCIAL

## 2024-09-10 ENCOUNTER — OFFICE VISIT (OUTPATIENT)
Dept: ORTHOPEDIC SURGERY | Facility: CLINIC | Age: 34
End: 2024-09-10
Payer: COMMERCIAL

## 2024-09-10 ENCOUNTER — HOSPITAL ENCOUNTER (OUTPATIENT)
Dept: RADIOLOGY | Facility: CLINIC | Age: 34
Discharge: HOME | End: 2024-09-10
Payer: COMMERCIAL

## 2024-09-10 DIAGNOSIS — M79.643 PAIN OF HAND, UNSPECIFIED LATERALITY: ICD-10-CM

## 2024-09-10 DIAGNOSIS — M77.12 LEFT LATERAL EPICONDYLITIS: ICD-10-CM

## 2024-09-10 PROCEDURE — 99214 OFFICE O/P EST MOD 30 MIN: CPT | Mod: GC | Performed by: ORTHOPAEDIC SURGERY

## 2024-09-10 PROCEDURE — 73130 X-RAY EXAM OF HAND: CPT | Mod: RT

## 2024-09-10 PROCEDURE — 1036F TOBACCO NON-USER: CPT | Performed by: ORTHOPAEDIC SURGERY

## 2024-09-10 PROCEDURE — 99214 OFFICE O/P EST MOD 30 MIN: CPT | Performed by: ORTHOPAEDIC SURGERY

## 2024-09-10 NOTE — PROGRESS NOTES
CHIEF COMPLAINT   Hand/wrist pain    ASSESSMENT + PLAN    Left lateral epicondylitis     The nature of lateral epicondylitis, and the long expected duration of symptoms (months) was reviewed, as was the possibility of late recurrence.  The options for treatment were discussed, including conservative and operative care, along with the major risks and benefits of each.    We agreed on obtaining a tennis elbow strap.  I reviewed proper strap position and tightness, and the critical importance of staying below the threshold of pain at all times.  Altered lifting mechanics were reviewed.  Recommendation for nonsteroidals and ice massage as needed for any breakthrough symptoms was reviewed.    If symptoms are continuing beyond 6 weeks without improvement, the patient was instructed to return to clinic to revisit the options of cortisone injection, or surgical intervention.      HISTORY OF PRESENT ILLNESS  Patient is a 33 y.o. right-hand dominant female, who presents today for evaluation of left forearm pain. Patient states she hit her hand on a fridge while navigating the kitchen in the dark one month ago. She had had persistent pain in her left arm since then.  It is from the lateral elbow down the proximal lateral forearm occasionally as far as the wrist.  There is no popping, clicking, or subjective instability.  No similar problem in the right.  No problems before this incident.  She is an  at a developmental needs facility and has found the pain worse when lifting boxes. She has tried a wrist brace without relief in symptoms.    She has Velia overgrowth syndrome. She is not hypothyroid. She does not smoke.     REVIEW OF SYSTEMS    A 30-item multi-system Review Of Systems was obtained on today's intake form.  This was reviewed with the patient and is correct.  The pertinent positives and negatives are listed above.  The form has been scanned separately into the medical record.    PHYSICAL  EXAM    Constitutional:    Appears stated age. Well-developed and well-nourished syndromic-appearing female in no acute distress.  Psychiatric:         Pleasant normal mood and affect. Behavior is appropriate for the situation.   Head:                   Normocephalic and atraumatic.  Eyes:                    Pupils are equal and round.  Cardiovascular:  2+ radial and ulnar pulses. Fingers well-perfused.  Respiratory:        Effort normal. No respiratory distress. Speaking in complete sentences.  Neurologic:       Alert and oriented to person, place, and time.  Skin:                Skin is intact, warm and dry.  Hematologic / Lymphatic:    No lymphedema or lymphangitis.    Extremities / Musculoskeletal:    Skin of the left hand, forearm, elbow is intact with no erythema, ecchymosis, diffuse swelling.  Normal skin drag and coloration.  Full composite finger flexion extension with good intrinsic plus and minus posture.  5/5 APB and hand intrinsics with no wasting.  Tender at the lateral epicondyle and just distally, but not as far as the radial tunnel.  Pain is reproduced with resisted wrist extension but only if the elbow is extended.  Negative long finger extension test.  Stable elbow collateral exam.  No joint effusion.  Cervical range of motion is good and does not cause any discomfort in the upper extremity.  Sensation intact to light touch in all distributions.  Capillary refill less than 2 seconds.    IMAGING / LABS / EMGs    Radiographs of the wrist independently interrupted by me demonstrate no acute fracture or malalignment. No evidence of osseous abnormality.  Joints are concentric and well-preserved.    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 Sig Documenting Provider Last Dose Status   Patient not taking:  Discontinued 02/05/24 1423   dulaglutide (Trulicity) 0.75 mg/0.5 mL pen injector [id number]  Inject 0.75 mg under the skin 1 (one) time per week. Angela Robles MD  Active   norgestimate-ethinyl estradioL (Tri-Sprintec, 28,) 0.18/0.215/0.25 mg-35 mcg (28) tablet [id number] No Take 1 tablet by mouth once daily. as directed EZEKIEL Arroyo Taking Active   nortriptyline (Pamelor) 10 mg capsule [id number] No Take 1 capsule (10 mg) by mouth once daily at bedtime. EZEKIEL Arroyo Taking Active   omeprazole (PriLOSEC) 40 mg DR capsule [id number] No Take 1 capsule (40 mg) by mouth once daily. LAST REFILL TILL APPT IS MADE Gianluca Morgan MD Taking Active   spironolactone (Aldactone) 50 mg tablet [id number]  Take 1 tablet (50 mg) by mouth 2 times a day. Angela Robles MD  Active                  No Known Allergies    Social History     Socioeconomic History    Marital status: Single     Spouse name: Not on file    Number of children: Not on file    Years of education: Not on file    Highest education level: Not on file   Occupational History    Not on file   Tobacco Use    Smoking status: Never    Smokeless tobacco: Never   Substance and Sexual Activity    Alcohol use: Not on file    Drug use: Not on file    Sexual activity: Not on file   Other Topics Concern    Not on file   Social History Narrative    Not on file     Social Determinants of Health     Financial Resource Strain: Not on file   Food  Insecurity: Not on file   Transportation Needs: Not on file   Physical Activity: Not on file   Stress: Not on file   Social Connections: Not on file   Intimate Partner Violence: Not on file   Housing Stability: Not on file       Past Surgical History:   Procedure Laterality Date    OTHER SURGICAL HISTORY  03/18/2022    Tonsillectomy    OTHER SURGICAL HISTORY  03/18/2022    Eye surgery     ATTESTATION    I saw and evaluated the patient. I personally obtained the key and critical portions of the history and physical exam or was physically present for key and critical portions performed by the resident/fellow. I reviewed the resident/fellow's documentation and discussed the patient with the resident/fellow. I agree with the resident/fellow's medical decision making as documented in the note.        Electronically signed  AKBAR Johnson MD  [phone]

## 2024-09-10 NOTE — LETTER
September 29, 2024     Stephanie Krause, APRN-CNP  [id number]   [address]  [facility] [address]    Patient: Milena Pardo   YOB: 1990   Date of Visit: 9/10/2024       Dear Dr. Stephanie Krause, APRN-CNP:    Thank you for referring Milena Pardo to me for evaluation. Below are my notes for this consultation.  If you have questions, please do not hesitate to call me. I look forward to following your patient along with you.       Sincerely,     Mack Johnson MD      CC: No Recipients  ______________________________________________________________________________________    CHIEF COMPLAINT   Hand/wrist pain    ASSESSMENT + PLAN    Left lateral epicondylitis     The nature of lateral epicondylitis, and the long expected duration of symptoms (months) was reviewed, as was the possibility of late recurrence.  The options for treatment were discussed, including conservative and operative care, along with the major risks and benefits of each.    We agreed on obtaining a tennis elbow strap.  I reviewed proper strap position and tightness, and the critical importance of staying below the threshold of pain at all times.  Altered lifting mechanics were reviewed.  Recommendation for nonsteroidals and ice massage as needed for any breakthrough symptoms was reviewed.    If symptoms are continuing beyond 6 weeks without improvement, the patient was instructed to return to clinic to revisit the options of cortisone injection, or surgical intervention.      HISTORY OF PRESENT ILLNESS  Patient is a 33 y.o. right-hand dominant female, who presents today for evaluation of left forearm pain. Patient states she hit her hand on a fridge while navigating the kitchen in the dark one month ago. She had had persistent pain in her left arm since then.  It is from the lateral elbow down the proximal lateral forearm occasionally as far as the wrist.  There is no popping, clicking, or subjective instability.  No similar problem in the right.   No problems before this incident.  She is an  at a developmental needs facility and has found the pain worse when lifting boxes. She has tried a wrist brace without relief in symptoms.    She has Velia overgrowth syndrome. She is not hypothyroid. She does not smoke.     REVIEW OF SYSTEMS    A 30-item multi-system Review Of Systems was obtained on today's intake form.  This was reviewed with the patient and is correct.  The pertinent positives and negatives are listed above.  The form has been scanned separately into the medical record.    PHYSICAL EXAM    Constitutional:    Appears stated age. Well-developed and well-nourished syndromic-appearing female in no acute distress.  Psychiatric:         Pleasant normal mood and affect. Behavior is appropriate for the situation.   Head:                   Normocephalic and atraumatic.  Eyes:                    Pupils are equal and round.  Cardiovascular:  2+ radial and ulnar pulses. Fingers well-perfused.  Respiratory:        Effort normal. No respiratory distress. Speaking in complete sentences.  Neurologic:       Alert and oriented to person, place, and time.  Skin:                Skin is intact, warm and dry.  Hematologic / Lymphatic:    No lymphedema or lymphangitis.    Extremities / Musculoskeletal:    Skin of the left hand, forearm, elbow is intact with no erythema, ecchymosis, diffuse swelling.  Normal skin drag and coloration.  Full composite finger flexion extension with good intrinsic plus and minus posture.  5/5 APB and hand intrinsics with no wasting.  Tender at the lateral epicondyle and just distally, but not as far as the radial tunnel.  Pain is reproduced with resisted wrist extension but only if the elbow is extended.  Negative long finger extension test.  Stable elbow collateral exam.  No joint effusion.  Cervical range of motion is good and does not cause any discomfort in the upper extremity.  Sensation intact to light touch in all  distributions.  Capillary refill less than 2 seconds.    IMAGING / LABS / EMGs    Radiographs of the wrist independently interrupted by me demonstrate no acute fracture or malalignment. No evidence of osseous abnormality.  Joints are concentric and well-preserved.    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 Sig Documenting Provider Last Dose Status   Patient not taking:  Discontinued 02/05/24 1423   dulaglutide (Trulicity) 0.75 mg/0.5 mL pen injector [id number]  Inject 0.75 mg under the skin 1 (one) time per week. Angela Robles MD  Active   norgestimate-ethinyl estradioL (Tri-Sprintec, 28,) 0.18/0.215/0.25 mg-35 mcg (28) tablet [id number] No Take 1 tablet by mouth once daily. as directed EZEKIEL Arroyo Taking Active   nortriptyline (Pamelor) 10 mg capsule [id number] No Take 1 capsule (10 mg) by mouth once daily at bedtime. EZEKIEL Arroyo Taking Active   omeprazole (PriLOSEC) 40 mg DR capsule [id number] No Take 1 capsule (40 mg) by mouth once daily. LAST REFILL TILL APPT IS MADE Gianluca Morgan MD Taking Active   spironolactone (Aldactone) 50 mg tablet [id number]  Take 1 tablet (50 mg) by mouth 2 times a day. Angela Robles MD  Active                  No Known Allergies    Social History      Socioeconomic History   • Marital status: Single     Spouse name: Not on file   • Number of children: Not on file   • Years of education: Not on file   • Highest education level: Not on file   Occupational History   • Not on file   Tobacco Use   • Smoking status: Never   • Smokeless tobacco: Never   Substance and Sexual Activity   • Alcohol use: Not on file   • Drug use: Not on file   • Sexual activity: Not on file   Other Topics Concern   • Not on file   Social History Narrative   • Not on file     Social Determinants of Health     Financial Resource Strain: Not on file   Food Insecurity: Not on file   Transportation Needs: Not on file   Physical Activity: Not on file   Stress: Not on file   Social Connections: Not on file   Intimate Partner Violence: Not on file   Housing Stability: Not on file       Past Surgical History:   Procedure Laterality Date   • OTHER SURGICAL HISTORY  03/18/2022    Tonsillectomy   • OTHER SURGICAL HISTORY  03/18/2022    Eye surgery     ATTESTATION    I saw and evaluated the patient. I personally obtained the key and critical portions of the history and physical exam or was physically present for key and critical portions performed by the resident/fellow. I reviewed the resident/fellow's documentation and discussed the patient with the resident/fellow. I agree with the resident/fellow's medical decision making as documented in the note.        Electronically signed  AKBAR Johnson MD  [phone]

## 2024-09-29 PROBLEM — M77.12 LEFT LATERAL EPICONDYLITIS: Status: ACTIVE | Noted: 2024-09-29

## 2024-10-03 ENCOUNTER — APPOINTMENT (OUTPATIENT)
Dept: ENDOCRINOLOGY | Facility: CLINIC | Age: 34
End: 2024-10-03
Payer: COMMERCIAL

## 2024-10-03 VITALS
SYSTOLIC BLOOD PRESSURE: 143 MMHG | TEMPERATURE: 97.2 F | HEIGHT: 72 IN | BODY MASS INDEX: 32.21 KG/M2 | WEIGHT: 237.8 LBS | DIASTOLIC BLOOD PRESSURE: 89 MMHG | HEART RATE: 114 BPM

## 2024-10-03 DIAGNOSIS — L68.0 HIRSUTISM: ICD-10-CM

## 2024-10-03 DIAGNOSIS — G47.00 INSOMNIA, UNSPECIFIED TYPE: ICD-10-CM

## 2024-10-03 DIAGNOSIS — E28.8 HYPERANDROGENISM: Primary | ICD-10-CM

## 2024-10-03 DIAGNOSIS — E88.819 INSULIN RESISTANCE: ICD-10-CM

## 2024-10-03 DIAGNOSIS — E28.2 PCOS (POLYCYSTIC OVARIAN SYNDROME): ICD-10-CM

## 2024-10-03 DIAGNOSIS — R73.9 HYPERGLYCEMIA: ICD-10-CM

## 2024-10-03 PROCEDURE — 99214 OFFICE O/P EST MOD 30 MIN: CPT | Performed by: STUDENT IN AN ORGANIZED HEALTH CARE EDUCATION/TRAINING PROGRAM

## 2024-10-03 PROCEDURE — 3008F BODY MASS INDEX DOCD: CPT | Performed by: STUDENT IN AN ORGANIZED HEALTH CARE EDUCATION/TRAINING PROGRAM

## 2024-10-03 PROCEDURE — 1036F TOBACCO NON-USER: CPT | Performed by: STUDENT IN AN ORGANIZED HEALTH CARE EDUCATION/TRAINING PROGRAM

## 2024-10-03 RX ORDER — SPIRONOLACTONE 100 MG/1
100 TABLET, FILM COATED ORAL 2 TIMES DAILY
Qty: 180 TABLET | Refills: 1 | Status: SHIPPED | OUTPATIENT
Start: 2024-10-03 | End: 2025-10-03

## 2024-10-03 RX ORDER — NORTRIPTYLINE HYDROCHLORIDE 10 MG/1
10 CAPSULE ORAL NIGHTLY
Qty: 90 CAPSULE | Refills: 1 | Status: SHIPPED | OUTPATIENT
Start: 2024-10-03 | End: 2025-04-01

## 2024-10-03 NOTE — PATIENT INSTRUCTIONS
Spironolactone 100mg twice a day   Trulicity 0.75mg weekly     Referral for Gynecology     Labs in 2 weeks, fasting from midnight    Follow up in 6 months    Angela Robles MD  Divison of Endocrinology   [facility]   Phone: [phone]    option 4, then option 1  Fax: [phone]

## 2024-10-03 NOTE — PROGRESS NOTES
33 F Velia sotos like syndrome    Following up regarding insulin resistance/PCOS    Endo work up   Dex suppresion normal  24 urine cortisol -not elevated   Total T elevated, DHEAS in the 200s  17ohpg not elevated   Had adrenal and ovarian imaging that was negative. 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    Social History     Socioeconomic History    Marital status: Single     Spouse name: Not on file    Number of children: Not on file    Years of education: Not on file    Highest education level: Not on file   Occupational History    Not on file   Tobacco Use    Smoking status: Never    Smokeless tobacco: Never   Substance and Sexual Activity    Alcohol use: Not on file    Drug use: Not on file    Sexual activity: Not on file   Other Topics Concern    Not on file   Social History Narrative    Not on file     Social Determinants of Health     Financial Resource Strain: Not on file   Food Insecurity: Not on file   Transportation Needs: Not on file   Physical Activity: Not on file   Stress: Not on file   Social Connections: Not on file   Intimate Partner  Violence: Not on file   Housing Stability: Not on file     ROS reviewed and is negative except for pertinent findings noted on HPI    Physical Exam  Constitutional:       Appearance: Normal appearance.      Comments: Enlarged forehead   Cardiovascular:      Rate and Rhythm: Normal rate and regular rhythm.   Abdominal:      Palpations: Abdomen is soft.      Comments: Abdominal obesity   Musculoskeletal:         General: No swelling or tenderness.   Skin:     General: Skin is warm.      Comments: Male pattern baldness, terminal hair on the arms, legs, chin   Neurological:      General: No focal deficit present.      Mental Status: She is alert and oriented to person, place, and time.   Psychiatric:         Mood and Affect: Mood normal.         Behavior: Behavior normal.         labs and imaging reviewed, pertinent findings listed on HPI and Impression    Problem List Items Addressed This Visit       Hyperandrogenism - Primary    Insulin resistance    Relevant Medications    dulaglutide (Trulicity) 0.75 mg/0.5 mL pen injector (Start on 10/6/2024)    spironolactone (Aldactone) 100 mg tablet    Other Relevant Orders    Referral to Gynecology    DHEA-Sulfate    Renal Function Panel    Testosterone,Free and Total    Hemoglobin A1C    Glucose, Fasting    PCOS (polycystic ovarian syndrome)    Relevant Medications    dulaglutide (Trulicity) 0.75 mg/0.5 mL pen injector (Start on 10/6/2024)    spironolactone (Aldactone) 100 mg tablet    Other Relevant Orders    Referral to Gynecology    DHEA-Sulfate    Renal Function Panel    Testosterone,Free and Total    Hemoglobin A1C    Glucose, Fasting     Other Visit Diagnoses       Hirsutism        Relevant Medications    dulaglutide (Trulicity) 0.75 mg/0.5 mL pen injector (Start on 10/6/2024)    spironolactone (Aldactone) 100 mg tablet    Other Relevant Orders    Referral to Gynecology    DHEA-Sulfate    Renal Function Panel    Testosterone,Free and Total    Hemoglobin A1C    Glucose,  Fasting    Hyperglycemia        Relevant Orders    Hemoglobin A1C          Hyperandrogenism   PCOS  -increase spironolactone 100mg BID  -metabolic/diabetic screening labs   -continue trulicity 0.75mg once week  -GYN referral for irregular bleeding/amenorrhea on OCP and routine care     Follow up in 6 months

## 2024-11-02 ENCOUNTER — LAB (OUTPATIENT)
Dept: LAB | Facility: LAB | Age: 34
End: 2024-11-02
Payer: COMMERCIAL

## 2024-11-02 DIAGNOSIS — L68.0 HIRSUTISM: ICD-10-CM

## 2024-11-02 DIAGNOSIS — R73.9 HYPERGLYCEMIA: ICD-10-CM

## 2024-11-02 DIAGNOSIS — E28.2 PCOS (POLYCYSTIC OVARIAN SYNDROME): ICD-10-CM

## 2024-11-02 DIAGNOSIS — E88.819 INSULIN RESISTANCE: ICD-10-CM

## 2024-11-02 LAB
ALBUMIN SERPL BCP-MCNC: 4.6 G/DL (ref 3.4–5)
ANION GAP SERPL CALC-SCNC: 16 MMOL/L (ref 10–20)
BUN SERPL-MCNC: 10 MG/DL (ref 6–23)
CALCIUM SERPL-MCNC: 9.4 MG/DL (ref 8.6–10.6)
CHLORIDE SERPL-SCNC: 102 MMOL/L (ref 98–107)
CO2 SERPL-SCNC: 25 MMOL/L (ref 21–32)
CREAT SERPL-MCNC: 0.66 MG/DL (ref 0.5–1.05)
DHEA-S SERPL-MCNC: 193 UG/DL (ref 12–379)
EGFRCR SERPLBLD CKD-EPI 2021: >90 ML/MIN/1.73M*2
EST. AVERAGE GLUCOSE BLD GHB EST-MCNC: 91 MG/DL
GLUCOSE P FAST SERPL-MCNC: 88 MG/DL (ref 74–99)
GLUCOSE SERPL-MCNC: 88 MG/DL (ref 74–99)
HBA1C MFR BLD: 4.8 %
PHOSPHATE SERPL-MCNC: 4 MG/DL (ref 2.5–4.9)
POTASSIUM SERPL-SCNC: 4.5 MMOL/L (ref 3.5–5.3)
SODIUM SERPL-SCNC: 138 MMOL/L (ref 136–145)

## 2024-11-02 PROCEDURE — 83036 HEMOGLOBIN GLYCOSYLATED A1C: CPT

## 2024-11-02 PROCEDURE — 80069 RENAL FUNCTION PANEL: CPT

## 2024-11-02 PROCEDURE — 82627 DEHYDROEPIANDROSTERONE: CPT

## 2024-11-02 PROCEDURE — 82947 ASSAY GLUCOSE BLOOD QUANT: CPT

## 2024-11-07 LAB
TESTOSTERONE FREE (CHAN): 15.1 PG/ML (ref 0.1–6.4)
TESTOSTERONE,TOTAL,LC-MS/MS: 121 NG/DL (ref 2–45)

## 2024-11-13 ENCOUNTER — TELEPHONE (OUTPATIENT)
Dept: ENDOCRINOLOGY | Facility: HOSPITAL | Age: 34
End: 2024-11-13
Payer: COMMERCIAL

## 2024-11-13 NOTE — TELEPHONE ENCOUNTER
----- Message from Makayla Villarreal sent at 11/11/2024  4:25 PM EST -----  Testosterone slightly higher from before.   Spironolactone increased in October K looks good   Would continue to monitor

## 2024-11-14 ENCOUNTER — APPOINTMENT (OUTPATIENT)
Dept: ENDOCRINOLOGY | Facility: CLINIC | Age: 34
End: 2024-11-14
Payer: COMMERCIAL

## 2024-11-22 DIAGNOSIS — N92.6 IRREGULAR MENSTRUATION: ICD-10-CM

## 2024-11-22 RX ORDER — NORGESTIMATE AND ETHINYL ESTRADIOL 7DAYSX3 28
1 KIT ORAL DAILY
Qty: 28 TABLET | Refills: 5 | Status: SHIPPED | OUTPATIENT
Start: 2024-11-22

## 2024-12-12 ENCOUNTER — APPOINTMENT (OUTPATIENT)
Dept: ENDOCRINOLOGY | Facility: CLINIC | Age: 34
End: 2024-12-12
Payer: COMMERCIAL

## 2024-12-26 PROBLEM — M54.50 LOW BACK PAIN: Status: ACTIVE | Noted: 2024-12-26

## 2024-12-26 PROBLEM — E24.0: Status: ACTIVE | Noted: 2024-12-26

## 2024-12-26 PROBLEM — N39.0 URINARY TRACT INFECTION: Status: ACTIVE | Noted: 2024-12-26

## 2024-12-26 PROBLEM — J01.90 ACUTE SINUSITIS: Status: ACTIVE | Noted: 2024-12-26

## 2024-12-26 PROBLEM — M25.562 PAIN IN BOTH KNEES: Status: ACTIVE | Noted: 2024-12-26

## 2024-12-26 PROBLEM — M25.561 PAIN IN BOTH KNEES: Status: ACTIVE | Noted: 2024-12-26

## 2024-12-26 PROBLEM — R10.9 ABDOMINAL PAIN: Status: ACTIVE | Noted: 2024-12-26

## 2024-12-26 PROBLEM — E11.42 TYPE 2 DIABETES MELLITUS WITH DIABETIC POLYNEUROPATHY: Status: ACTIVE | Noted: 2024-12-26

## 2024-12-26 PROBLEM — L68.8 OTHER HYPERTRICHOSIS: Status: ACTIVE | Noted: 2022-01-12

## 2024-12-26 PROBLEM — J30.9 ALLERGIC RHINITIS: Status: ACTIVE | Noted: 2024-12-26

## 2024-12-26 PROBLEM — R87.610 PAP SMEAR ABNORMALITY OF CERVIX WITH ASCUS FAVORING BENIGN: Status: ACTIVE | Noted: 2024-12-26

## 2024-12-26 PROBLEM — R13.10 DYSPHAGIA: Status: ACTIVE | Noted: 2024-12-26

## 2024-12-26 PROBLEM — L73.1 PSEUDOFOLLICULITIS BARBAE: Status: ACTIVE | Noted: 2022-01-12

## 2024-12-26 PROBLEM — R79.89 ELEVATED TESTOSTERONE LEVEL IN FEMALE: Status: ACTIVE | Noted: 2023-12-28

## 2024-12-26 PROBLEM — R73.9 ACUTE HYPERGLYCEMIA: Status: ACTIVE | Noted: 2024-12-26

## 2024-12-26 PROBLEM — E66.9 OBESITY: Status: ACTIVE | Noted: 2024-12-26

## 2024-12-26 RX ORDER — DICYCLOMINE HYDROCHLORIDE 20 MG/1
TABLET ORAL 4 TIMES DAILY
COMMUNITY
Start: 2022-11-14 | End: 2024-12-27 | Stop reason: WASHOUT

## 2024-12-26 RX ORDER — IBUPROFEN 200 MG
200 TABLET ORAL EVERY 6 HOURS PRN
COMMUNITY

## 2024-12-26 RX ORDER — PSYLLIUM HUSK 3.4 G/5.8G
POWDER ORAL
COMMUNITY
Start: 2022-12-27 | End: 2024-12-27 | Stop reason: WASHOUT

## 2024-12-27 ENCOUNTER — APPOINTMENT (OUTPATIENT)
Dept: PRIMARY CARE | Facility: CLINIC | Age: 34
End: 2024-12-27
Payer: COMMERCIAL

## 2024-12-27 VITALS
HEIGHT: 72 IN | BODY MASS INDEX: 32.53 KG/M2 | DIASTOLIC BLOOD PRESSURE: 90 MMHG | WEIGHT: 240.2 LBS | SYSTOLIC BLOOD PRESSURE: 122 MMHG | HEART RATE: 110 BPM | OXYGEN SATURATION: 99 % | TEMPERATURE: 97.7 F

## 2024-12-27 DIAGNOSIS — F41.9 ANXIETY: ICD-10-CM

## 2024-12-27 DIAGNOSIS — Z00.00 HEALTH MAINTENANCE EXAMINATION: Primary | ICD-10-CM

## 2024-12-27 PROBLEM — R10.9 ABDOMINAL PAIN: Status: RESOLVED | Noted: 2024-12-26 | Resolved: 2024-12-27

## 2024-12-27 PROBLEM — J01.90 ACUTE SINUSITIS: Status: RESOLVED | Noted: 2024-12-26 | Resolved: 2024-12-27

## 2024-12-27 PROBLEM — N39.0 URINARY TRACT INFECTION: Status: RESOLVED | Noted: 2024-12-26 | Resolved: 2024-12-27

## 2024-12-27 PROBLEM — R73.9 ACUTE HYPERGLYCEMIA: Status: RESOLVED | Noted: 2024-12-26 | Resolved: 2024-12-27

## 2024-12-27 LAB
ANION GAP SERPL CALC-SCNC: 16 MMOL/L (ref 10–20)
BUN SERPL-MCNC: 16 MG/DL (ref 6–23)
CALCIUM SERPL-MCNC: 9.4 MG/DL (ref 8.6–10.6)
CHLORIDE SERPL-SCNC: 102 MMOL/L (ref 98–107)
CHOLEST SERPL-MCNC: 248 MG/DL (ref 0–199)
CHOLESTEROL/HDL RATIO: 6.5
CO2 SERPL-SCNC: 25 MMOL/L (ref 21–32)
CREAT SERPL-MCNC: 0.77 MG/DL (ref 0.5–1.05)
EGFRCR SERPLBLD CKD-EPI 2021: >90 ML/MIN/1.73M*2
ERYTHROCYTE [DISTWIDTH] IN BLOOD BY AUTOMATED COUNT: 12.4 % (ref 11.5–14.5)
GLUCOSE SERPL-MCNC: 72 MG/DL (ref 74–99)
HCT VFR BLD AUTO: 45.3 % (ref 36–46)
HDLC SERPL-MCNC: 38.3 MG/DL
HGB BLD-MCNC: 15.4 G/DL (ref 12–16)
LDLC SERPL CALC-MCNC: ABNORMAL MG/DL
MCH RBC QN AUTO: 33.3 PG (ref 26–34)
MCHC RBC AUTO-ENTMCNC: 34 G/DL (ref 32–36)
MCV RBC AUTO: 98 FL (ref 80–100)
NON HDL CHOLESTEROL: 210 MG/DL (ref 0–149)
NRBC BLD-RTO: 0 /100 WBCS (ref 0–0)
PLATELET # BLD AUTO: 318 X10*3/UL (ref 150–450)
POTASSIUM SERPL-SCNC: 4.5 MMOL/L (ref 3.5–5.3)
RBC # BLD AUTO: 4.62 X10*6/UL (ref 4–5.2)
SODIUM SERPL-SCNC: 138 MMOL/L (ref 136–145)
TRIGL SERPL-MCNC: 735 MG/DL (ref 0–149)
VLDL: ABNORMAL
WBC # BLD AUTO: 9.1 X10*3/UL (ref 4.4–11.3)

## 2024-12-27 PROCEDURE — 3008F BODY MASS INDEX DOCD: CPT | Performed by: FAMILY MEDICINE

## 2024-12-27 PROCEDURE — 3080F DIAST BP >= 90 MM HG: CPT | Performed by: FAMILY MEDICINE

## 2024-12-27 PROCEDURE — 99395 PREV VISIT EST AGE 18-39: CPT | Performed by: FAMILY MEDICINE

## 2024-12-27 PROCEDURE — 3074F SYST BP LT 130 MM HG: CPT | Performed by: FAMILY MEDICINE

## 2024-12-27 PROCEDURE — 3044F HG A1C LEVEL LT 7.0%: CPT | Performed by: FAMILY MEDICINE

## 2024-12-27 PROCEDURE — 80061 LIPID PANEL: CPT

## 2024-12-27 PROCEDURE — 85027 COMPLETE CBC AUTOMATED: CPT

## 2024-12-27 PROCEDURE — 1036F TOBACCO NON-USER: CPT | Performed by: FAMILY MEDICINE

## 2024-12-27 PROCEDURE — 80048 BASIC METABOLIC PNL TOTAL CA: CPT

## 2024-12-27 ASSESSMENT — PATIENT HEALTH QUESTIONNAIRE - PHQ9
SUM OF ALL RESPONSES TO PHQ9 QUESTIONS 1 AND 2: 0
2. FEELING DOWN, DEPRESSED OR HOPELESS: NOT AT ALL
1. LITTLE INTEREST OR PLEASURE IN DOING THINGS: NOT AT ALL

## 2024-12-27 ASSESSMENT — ENCOUNTER SYMPTOMS
LOSS OF SENSATION IN FEET: 0
OCCASIONAL FEELINGS OF UNSTEADINESS: 0
DEPRESSION: 0

## 2024-12-27 NOTE — ASSESSMENT & PLAN NOTE
Recommended screening guidelines addressed and orders placed as indicated by age and chronic conditions  Screening labs ordered, will call with results  Continue to work on healthy lifestyle including well balanced diet, regular activity, limit alcohol, no tobacco products  Follow up annually

## 2024-12-27 NOTE — ASSESSMENT & PLAN NOTE
Tachycardia and elevated BP  Improved with recheck  Monitor vitals to make sure not more chronic in nature

## 2024-12-27 NOTE — PROGRESS NOTES
Reason for Visit: Annual Physical Exam    HPI:    Presents with mom for wellness exam  Continues to deal with left ankle pain, seeing ortho  Saw Dr. Johnson for left tennis elbow, still painful at times  Follows with endocrine, continues to struggle with hirsutism      Active Problem List  Patient Active Problem List   Diagnosis    Amenorrhea, secondary    Anxiety    Congenital nystagmus    Constipation    Developmental disability    Dysmorphic features    Female hirsutism    Elevated testosterone level in female    Female infertility    Balding    GERD (gastroesophageal reflux disease)    Hyperandrogenism    Hypercortisolism (Multi)    Hyperprolactinemia (Multi)    Insulin resistance    Intermittent exotropia, alternating    Irregular menstrual cycle    Velia overgrowth syndrome (HHS-HCC)    Macroencephaly (Multi)    Migraine headache    Health maintenance examination    Acute left ankle pain    Left lateral epicondylitis    PCOS (polycystic ovarian syndrome)    Allergic rhinitis    Dysphagia    Low back pain    Other hypertrichosis    Pain in both knees    Pap smear abnormality of cervix with ASCUS favoring benign    Pituitary-dependent Cushing's disease (Multi)    Simple chronic serous otitis media    Pseudofolliculitis barbae    Type 2 diabetes mellitus with diabetic polyneuropathy    Obesity       Comprehensive Medical/Surgical/Social/Family History  Past Medical History:   Diagnosis Date    Body mass index (BMI) 34.0-34.9, adult 12/27/2020    Body mass index (BMI) of 34.0 to 34.9 in adult    Irregular menstruation, unspecified 01/13/2022    Irregular menstrual cycle    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    Megalencephaly    Migraine, unspecified, not intractable, without status migrainosus 01/04/2022    Migraine headache    Multiple congenital malformations, not elsewhere classified 01/13/2022    Dysmorphic features    Nonscarring hair loss, unspecified 01/13/2022    Frontal balding    Other conditions  influencing health status     Nulliparity     Past Surgical History:   Procedure Laterality Date    OTHER SURGICAL HISTORY  03/18/2022    Tonsillectomy    OTHER SURGICAL HISTORY  03/18/2022    Eye surgery     Social History     Tobacco Use    Smoking status: Never    Smokeless tobacco: Never   Substance Use Topics    Alcohol use: Never    Drug use: Never     No family history on file.      Allergies and Medications  Patient has no known allergies.  Current Outpatient Medications on File Prior to Visit   Medication Sig Dispense Refill    cholecalciferol (Vitamin D-3) 25 MCG (1000 UT) tablet Take 1 tablet daily 90 tablet 3    dulaglutide (Trulicity) 0.75 mg/0.5 mL pen injector Inject 0.75 mg under the skin 1 (one) time per week. 12 each 3    ibuprofen 200 mg tablet Take 1 tablet (200 mg) by mouth every 6 hours if needed.      norgestimate-ethinyl estradioL (Tri-Sprintec, 28,) 0.18/0.215/0.25 mg-35 mcg (28) tablet Take 1 tablet by mouth once daily. as directed 28 tablet 5    nortriptyline (Pamelor) 10 mg capsule Take 1 capsule (10 mg) by mouth once daily at bedtime. 90 capsule 1    spironolactone (Aldactone) 100 mg tablet Take 1 tablet (100 mg) by mouth 2 times a day. 180 tablet 1    [DISCONTINUED] dicyclomine (Bentyl) 20 mg tablet Take by mouth 4 times a day. (Patient not taking: Reported on 12/27/2024)      [DISCONTINUED] omeprazole (PriLOSEC) 40 mg DR capsule Take 1 capsule (40 mg) by mouth once daily. LAST REFILL TILL APPT IS MADE (Patient not taking: Reported on 12/27/2024) 30 capsule 0    [DISCONTINUED] psyllium (Metamucil Fiber Singles) 3.4 gram packet Take by mouth. (Patient not taking: Reported on 12/27/2024)       No current facility-administered medications on file prior to visit.         ROS otherwise negative aside from what was mentioned above in HPI.    Vitals  /90   Pulse 110   Temp 36.5 °C (97.7 °F) (Temporal)   Ht 1.829 m (6')   Wt 109 kg (240 lb 3.2 oz)   SpO2 99%   BMI 32.58 kg/m²   Body  mass index is 32.58 kg/m².  Physical Exam  Gen: Alert, NAD  HEENT:  PERRL, EOMI, conjunctiva and sclera normal in appearance. External auditory canals/TMs normal; Tube on right, Oral cavity and posterior pharynx without lesions/exudate  Neck:  Supple with FROM; No masses/nodes palpable; No GARRETT  Respiratory:  Lungs CTAB  Cardiovascular:  Heart RRR. No M/R/G. Peripheral pulses equal bilaterally  Extremities:  FROM all extremities; Muscle strength grossly normal with good tone  Neuro:  CN II-XII intact; Reflexes 2+/2+; Gross motor and sensory intact  Skin:  No suspicious lesions present, male pattern baldness on forehead and hirsutism present    Assessment and Plan:  Problem List Items Addressed This Visit       Anxiety    Current Assessment & Plan     Tachycardia and elevated BP  Improved with recheck  Monitor vitals to make sure not more chronic in nature         Health maintenance examination - Primary    Current Assessment & Plan     Recommended screening guidelines addressed and orders placed as indicated by age and chronic conditions  Screening labs ordered, will call with results  Continue to work on healthy lifestyle including well balanced diet, regular activity, limit alcohol, no tobacco products  Follow up annually           Relevant Orders    Lipid Panel    Basic Metabolic Panel    CBC     Other Visit Diagnoses       BMI 32.0-32.9,adult        Relevant Orders    CBC              Heather Husain MD

## 2024-12-30 ENCOUNTER — TELEPHONE (OUTPATIENT)
Dept: PRIMARY CARE | Facility: CLINIC | Age: 34
End: 2024-12-30
Payer: COMMERCIAL

## 2024-12-30 NOTE — TELEPHONE ENCOUNTER
Called and spoke to pt's mother, let her know I scanned the annual medical evaluation form into pt's chart.

## 2024-12-31 ENCOUNTER — APPOINTMENT (OUTPATIENT)
Dept: PRIMARY CARE | Facility: CLINIC | Age: 34
End: 2024-12-31
Payer: COMMERCIAL

## 2025-03-05 DIAGNOSIS — G47.00 INSOMNIA, UNSPECIFIED TYPE: ICD-10-CM

## 2025-03-05 RX ORDER — NORTRIPTYLINE HYDROCHLORIDE 10 MG/1
10 CAPSULE ORAL NIGHTLY
Qty: 90 CAPSULE | Refills: 1 | Status: SHIPPED | OUTPATIENT
Start: 2025-03-05 | End: 2025-09-01

## 2025-03-26 DIAGNOSIS — E28.2 PCOS (POLYCYSTIC OVARIAN SYNDROME): ICD-10-CM

## 2025-03-26 DIAGNOSIS — E88.819 INSULIN RESISTANCE: ICD-10-CM

## 2025-03-26 DIAGNOSIS — L68.0 HIRSUTISM: ICD-10-CM

## 2025-03-27 RX ORDER — SPIRONOLACTONE 100 MG/1
100 TABLET, FILM COATED ORAL 2 TIMES DAILY
Qty: 180 TABLET | Refills: 1 | Status: SHIPPED | OUTPATIENT
Start: 2025-03-27

## 2025-04-03 ENCOUNTER — APPOINTMENT (OUTPATIENT)
Dept: ENDOCRINOLOGY | Facility: CLINIC | Age: 35
End: 2025-04-03
Payer: COMMERCIAL

## 2025-04-03 VITALS
HEART RATE: 112 BPM | WEIGHT: 242 LBS | DIASTOLIC BLOOD PRESSURE: 75 MMHG | SYSTOLIC BLOOD PRESSURE: 145 MMHG | HEIGHT: 72 IN | BODY MASS INDEX: 32.78 KG/M2 | TEMPERATURE: 97.9 F

## 2025-04-03 DIAGNOSIS — R73.9 HYPERGLYCEMIA: ICD-10-CM

## 2025-04-03 DIAGNOSIS — E28.2 PCOS (POLYCYSTIC OVARIAN SYNDROME): ICD-10-CM

## 2025-04-03 DIAGNOSIS — L68.0 HIRSUTISM: ICD-10-CM

## 2025-04-03 DIAGNOSIS — E88.819 INSULIN RESISTANCE: ICD-10-CM

## 2025-04-03 DIAGNOSIS — E28.8 HYPERANDROGENISM: Primary | ICD-10-CM

## 2025-04-03 PROCEDURE — G2211 COMPLEX E/M VISIT ADD ON: HCPCS | Performed by: STUDENT IN AN ORGANIZED HEALTH CARE EDUCATION/TRAINING PROGRAM

## 2025-04-03 PROCEDURE — 99213 OFFICE O/P EST LOW 20 MIN: CPT | Performed by: STUDENT IN AN ORGANIZED HEALTH CARE EDUCATION/TRAINING PROGRAM

## 2025-04-03 PROCEDURE — 3008F BODY MASS INDEX DOCD: CPT | Performed by: STUDENT IN AN ORGANIZED HEALTH CARE EDUCATION/TRAINING PROGRAM

## 2025-04-03 PROCEDURE — 1036F TOBACCO NON-USER: CPT | Performed by: STUDENT IN AN ORGANIZED HEALTH CARE EDUCATION/TRAINING PROGRAM

## 2025-04-03 PROCEDURE — 3078F DIAST BP <80 MM HG: CPT | Performed by: STUDENT IN AN ORGANIZED HEALTH CARE EDUCATION/TRAINING PROGRAM

## 2025-04-03 PROCEDURE — 3077F SYST BP >= 140 MM HG: CPT | Performed by: STUDENT IN AN ORGANIZED HEALTH CARE EDUCATION/TRAINING PROGRAM

## 2025-04-03 RX ORDER — SPIRONOLACTONE 100 MG/1
100 TABLET, FILM COATED ORAL 2 TIMES DAILY
Qty: 180 TABLET | Refills: 1 | Status: SHIPPED | OUTPATIENT
Start: 2025-04-03

## 2025-04-03 NOTE — PROGRESS NOTES
34 F Velia sotos like syndrome    Following up regarding insulin resistance/PCOS    Previous Endo work up:   Dex suppresion normal 2021  24 urine cortisol -not elevated 2022, but was slightly abnormal in 2021  Total T elevated, DHEAS in the 200s  17ohpg not elevated   Had adrenal and ovarian imaging that was negative in 8360-1608. No adrenal nodules or adrenal thickening     GYN- currently on OCP was getting her periods   Gets periods monthly    Hirsustism:  On sprinolactone 100mg BID -increased in 11/2024  Metformin-dced on 9/2023   On trulicity 0.75mg weekly     Social-works at [employer]     Slow weight gain  Sedentary  No changes in hair   ROS reviewed and is negative except for pertinent findings noted on HPI      Past Medical History:   Diagnosis Date    Body mass index (BMI) 34.0-34.9, adult 12/27/2020    Body mass index (BMI) of 34.0 to 34.9 in adult    Irregular menstruation, unspecified 01/13/2022    Irregular menstrual cycle    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    Social History     Socioeconomic History    Marital status: Single     Spouse name: Not on file    Number of children: Not on file    Years of education: Not on file    Highest education level: Not on file   Occupational History    Not on file   Tobacco Use    Smoking status: Never    Smokeless tobacco: Never   Substance and Sexual Activity    Alcohol use: Never    Drug use: Never    Sexual activity: Not on file   Other Topics Concern    Not on file   Social History Narrative    Not on file     Social Drivers of Health     Financial Resource Strain: Not on  file   Food Insecurity: Not on file   Transportation Needs: Not on file   Physical Activity: Not on file   Stress: Not on file   Social Connections: Not on file   Intimate Partner Violence: Not on file   Housing Stability: Not on file     ROS reviewed and is negative except for pertinent findings noted on HPI    Physical Exam  Constitutional:       Appearance: Normal appearance.      Comments: Enlarged forehead   Cardiovascular:      Rate and Rhythm: Normal rate and regular rhythm.   Abdominal:      Palpations: Abdomen is soft.      Comments: Abdominal obesity   Musculoskeletal:         General: No swelling or tenderness.   Skin:     General: Skin is warm.      Comments: No acne, Male pattern baldness, terminal hair on the arms, legs, chin   Neurological:      General: No focal deficit present.      Mental Status: She is alert and oriented to person, place, and time.   Psychiatric:         Mood and Affect: Mood normal.         Behavior: Behavior normal.         labs and imaging reviewed, pertinent findings listed on HPI and Impression    Problem List Items Addressed This Visit       Hyperandrogenism - Primary    Relevant Orders    DHEA-Sulfate    Testosterone,Free and Total    Creatinine, 24 Hour Urine    Cortisol, Urine, Free    Cortisol AM    Adrenocorticotropic Hormone (ACTH)    Insulin resistance    Relevant Medications    spironolactone (Aldactone) 100 mg tablet    dulaglutide (Trulicity) 0.75 mg/0.5 mL pen injector    PCOS (polycystic ovarian syndrome)    Relevant Medications    spironolactone (Aldactone) 100 mg tablet    dulaglutide (Trulicity) 0.75 mg/0.5 mL pen injector    Other Relevant Orders    Hemoglobin A1C     Other Visit Diagnoses       Hirsutism        Relevant Medications    spironolactone (Aldactone) 100 mg tablet    dulaglutide (Trulicity) 0.75 mg/0.5 mL pen injector    Hyperglycemia        Relevant Orders    Hemoglobin A1C            Hyperandrogenism   PCOS  -spironolactone 100mg twice a  day  -continue trulicity 0.75mg once week  -GYN referral for routine GYN exams  Repeat AM cortisol, testosterone and DHEAS    Will repeat 24 hr urine cortisol, due to previous abnormal lab    Follow up in 6 months

## 2025-04-03 NOTE — PATIENT INSTRUCTIONS
-spironolactone 100mg twice a day  -continue trulicity 0.75mg once week    Schedule an appointment with GYN     Labs 8-9am in the morning    Repeat your 24 hour urine collection     24 hour urine collection     -You should collect every drop of urine during each 24-hour period. It does not matter how much or little urine is passed each time, as long as every drop is collected.  -Begin the urine collection in the morning after you wake up, after you have emptied your bladder for the first time.  -Urinate (empty the bladder) for the first time and flush it down the toilet. Note the exact time (eg, 6:15 AM). You will begin the urine collection at this time.  -Collect every drop of urine during the day and night in an empty collection bottle. Store the bottle at room temperature or in the refrigerator.  -If you need to have a bowel movement, any urine passed with the bowel movement should be collected. Try not to include feces with the urine collection. If feces does get mixed in, do not try to remove the feces from the urine collection bottle.  -Finish by collecting the first urine passed the next morning, adding it to the collection bottle. This should be within ten minutes before or after the time of the first morning void on the first day (which was flushed). In this example, you would try to void between 6:05 and 6:25 on the second day.    Follow up in 6 months    Angela Robles MD  Divison of Endocrinology   [facility]   Phone: [phone]    option 4, then option 1  Fax: [phone]

## 2025-04-06 LAB
ACTH PLAS-MCNC: NORMAL PG/ML
CORTIS AM PEAK SERPL-MCNC: 22.8 MCG/DL
DHEA-S SERPL-MCNC: 176 MCG/DL (ref 19–237)
EST. AVERAGE GLUCOSE BLD GHB EST-MCNC: 103 MG/DL
EST. AVERAGE GLUCOSE BLD GHB EST-SCNC: 5.7 MMOL/L
HBA1C MFR BLD: 5.2 % OF TOTAL HGB
TESTOST FREE SERPL-MCNC: NORMAL PG/ML
TESTOST SERPL-MCNC: NORMAL NG/DL

## 2025-04-07 ENCOUNTER — LAB (OUTPATIENT)
Dept: LAB | Facility: HOSPITAL | Age: 35
End: 2025-04-07
Payer: COMMERCIAL

## 2025-04-07 DIAGNOSIS — E28.8 HYPERANDROGENISM: ICD-10-CM

## 2025-04-08 LAB — CREAT 24H UR-MRATE: 1 G/24 H (ref 0.5–2.15)

## 2025-04-10 LAB
ACTH PLAS-MCNC: 17 PG/ML (ref 6–50)
CORTIS AM PEAK SERPL-MCNC: 22.8 MCG/DL
DHEA-S SERPL-MCNC: 176 MCG/DL (ref 19–237)
EST. AVERAGE GLUCOSE BLD GHB EST-MCNC: 103 MG/DL
EST. AVERAGE GLUCOSE BLD GHB EST-SCNC: 5.7 MMOL/L
HBA1C MFR BLD: 5.2 % OF TOTAL HGB
TESTOST FREE SERPL-MCNC: 7.2 PG/ML (ref 0.1–6.4)
TESTOST SERPL-MCNC: 69 NG/DL (ref 2–45)

## 2025-04-16 ENCOUNTER — PATIENT MESSAGE (OUTPATIENT)
Dept: PRIMARY CARE | Facility: CLINIC | Age: 35
End: 2025-04-16
Payer: COMMERCIAL

## 2025-04-30 DIAGNOSIS — N92.6 IRREGULAR MENSTRUATION: ICD-10-CM

## 2025-04-30 RX ORDER — NORGESTIMATE AND ETHINYL ESTRADIOL 7DAYSX3 28
1 KIT ORAL DAILY
Qty: 28 TABLET | Refills: 12 | Status: SHIPPED | OUTPATIENT
Start: 2025-04-30

## 2025-06-24 DIAGNOSIS — E55.9 VITAMIN D DEFICIENCY: ICD-10-CM

## 2025-06-25 RX ORDER — CHOLECALCIFEROL (VITAMIN D3) 25 MCG
TABLET ORAL
Qty: 90 TABLET | Refills: 3 | Status: SHIPPED | OUTPATIENT
Start: 2025-06-25

## 2025-10-16 ENCOUNTER — APPOINTMENT (OUTPATIENT)
Dept: ENDOCRINOLOGY | Facility: CLINIC | Age: 35
End: 2025-10-16
Payer: COMMERCIAL

## 2025-12-11 ENCOUNTER — APPOINTMENT (OUTPATIENT)
Dept: ENDOCRINOLOGY | Facility: CLINIC | Age: 35
End: 2025-12-11
Payer: COMMERCIAL